# Patient Record
Sex: MALE | Race: WHITE | NOT HISPANIC OR LATINO | Employment: OTHER | URBAN - METROPOLITAN AREA
[De-identification: names, ages, dates, MRNs, and addresses within clinical notes are randomized per-mention and may not be internally consistent; named-entity substitution may affect disease eponyms.]

---

## 2018-01-26 ENCOUNTER — OFFICE VISIT CONVERTED (OUTPATIENT)
Dept: FAMILY MEDICINE CLINIC | Facility: CLINIC | Age: 53
End: 2018-01-26
Attending: NURSE PRACTITIONER

## 2019-11-06 ENCOUNTER — HOSPITAL ENCOUNTER (OUTPATIENT)
Dept: LAB | Facility: HOSPITAL | Age: 54
Discharge: HOME | End: 2019-11-06
Attending: NURSE PRACTITIONER

## 2019-11-06 LAB
25(OH)D3 SERPL-MCNC: 36.3 NG/ML (ref 30–100)
ALBUMIN SERPL-MCNC: 5 G/DL (ref 3.5–5)
ALBUMIN/GLOB SERPL: 1.7 {RATIO} (ref 1.4–2.6)
ALP SERPL-CCNC: 72 U/L (ref 56–119)
ALT SERPL-CCNC: 28 U/L (ref 10–40)
ANION GAP SERPL CALC-SCNC: 24 MMOL/L (ref 8–19)
AST SERPL-CCNC: 23 U/L (ref 15–50)
BASOPHILS # BLD AUTO: 0.03 10*3/UL (ref 0–0.2)
BASOPHILS NFR BLD AUTO: 0.7 % (ref 0–3)
BILIRUB SERPL-MCNC: 0.39 MG/DL (ref 0.2–1.3)
BUN SERPL-MCNC: 14 MG/DL (ref 5–25)
BUN/CREAT SERPL: 14 {RATIO} (ref 6–20)
CALCIUM SERPL-MCNC: 9.8 MG/DL (ref 8.7–10.4)
CHLORIDE SERPL-SCNC: 99 MMOL/L (ref 99–111)
CHOLEST SERPL-MCNC: 231 MG/DL (ref 107–200)
CHOLEST/HDLC SERPL: 3.4 {RATIO} (ref 3–6)
CONV ABS IMM GRAN: 0.02 10*3/UL (ref 0–0.2)
CONV CO2: 23 MMOL/L (ref 22–32)
CONV IMMATURE GRAN: 0.5 % (ref 0–1.8)
CONV TOTAL PROTEIN: 8 G/DL (ref 6.3–8.2)
CREAT UR-MCNC: 1.02 MG/DL (ref 0.7–1.2)
DEPRECATED RDW RBC AUTO: 41.7 FL (ref 35.1–43.9)
EOSINOPHIL # BLD AUTO: 0.13 10*3/UL (ref 0–0.7)
EOSINOPHIL # BLD AUTO: 3 % (ref 0–7)
ERYTHROCYTE [DISTWIDTH] IN BLOOD BY AUTOMATED COUNT: 12.7 % (ref 11.6–14.4)
FOLATE SERPL-MCNC: >20 NG/ML (ref 4.8–20)
GFR SERPLBLD BASED ON 1.73 SQ M-ARVRAT: >60 ML/MIN/{1.73_M2}
GLOBULIN UR ELPH-MCNC: 3 G/DL (ref 2–3.5)
GLUCOSE SERPL-MCNC: 95 MG/DL (ref 70–99)
HCT VFR BLD AUTO: 47.5 % (ref 42–52)
HDLC SERPL-MCNC: 68 MG/DL (ref 40–60)
HGB BLD-MCNC: 15.8 G/DL (ref 14–18)
IRON SERPL-MCNC: 89 UG/DL (ref 70–180)
LDLC SERPL CALC-MCNC: 136 MG/DL (ref 70–100)
LYMPHOCYTES # BLD AUTO: 1.55 10*3/UL (ref 1–5)
LYMPHOCYTES NFR BLD AUTO: 35.4 % (ref 20–45)
MCH RBC QN AUTO: 29.9 PG (ref 27–31)
MCHC RBC AUTO-ENTMCNC: 33.3 G/DL (ref 33–37)
MCV RBC AUTO: 89.8 FL (ref 80–96)
MONOCYTES # BLD AUTO: 0.58 10*3/UL (ref 0.2–1.2)
MONOCYTES NFR BLD AUTO: 13.2 % (ref 3–10)
NEUTROPHILS # BLD AUTO: 2.07 10*3/UL (ref 2–8)
NEUTROPHILS NFR BLD AUTO: 47.2 % (ref 30–85)
NRBC CBCN: 0 % (ref 0–0.7)
OSMOLALITY SERPL CALC.SUM OF ELEC: 292 MOSM/KG (ref 273–304)
PLATELET # BLD AUTO: 240 10*3/UL (ref 130–400)
PMV BLD AUTO: 9.7 FL (ref 9.4–12.4)
POTASSIUM SERPL-SCNC: 4.6 MMOL/L (ref 3.5–5.3)
RBC # BLD AUTO: 5.29 10*6/UL (ref 4.7–6.1)
SODIUM SERPL-SCNC: 141 MMOL/L (ref 135–147)
T4 FREE SERPL-MCNC: 1.2 NG/DL (ref 0.9–1.8)
TRIGL SERPL-MCNC: 134 MG/DL (ref 40–150)
TSH SERPL-ACNC: 5.68 M[IU]/L (ref 0.27–4.2)
VIT B12 SERPL-MCNC: 591 PG/ML (ref 211–911)
VLDLC SERPL-MCNC: 27 MG/DL (ref 5–37)
WBC # BLD AUTO: 4.38 10*3/UL (ref 4.8–10.8)

## 2019-11-07 LAB — T3FREE SERPL-MCNC: 3.4 PG/ML (ref 2–4.4)

## 2020-01-07 ENCOUNTER — HOSPITAL ENCOUNTER (OUTPATIENT)
Dept: LAB | Facility: HOSPITAL | Age: 55
Discharge: HOME | End: 2020-01-07
Attending: NURSE PRACTITIONER

## 2020-01-07 LAB
T4 FREE SERPL-MCNC: 1.5 NG/DL (ref 0.9–1.8)
TSH SERPL-ACNC: 4.05 M[IU]/L (ref 0.27–4.2)

## 2020-01-08 LAB — T3FREE SERPL-MCNC: 3.3 PG/ML (ref 2–4.4)

## 2020-02-28 ENCOUNTER — HOSPITAL ENCOUNTER (OUTPATIENT)
Dept: LAB | Facility: HOSPITAL | Age: 55
Discharge: HOME | End: 2020-02-28
Attending: NURSE PRACTITIONER

## 2020-02-28 LAB
T4 FREE SERPL-MCNC: 1.6 NG/DL (ref 0.9–1.8)
TSH SERPL-ACNC: 2.21 M[IU]/L (ref 0.27–4.2)

## 2020-10-05 ENCOUNTER — HOSPITAL ENCOUNTER (OUTPATIENT)
Dept: LAB | Facility: HOSPITAL | Age: 55
Discharge: HOME | End: 2020-10-05
Attending: NURSE PRACTITIONER

## 2020-10-05 LAB
25(OH)D3 SERPL-MCNC: 36.8 NG/ML (ref 30–100)
ALBUMIN SERPL-MCNC: 4.6 G/DL (ref 3.5–5)
ALBUMIN/GLOB SERPL: 1.7 {RATIO} (ref 1.4–2.6)
ALP SERPL-CCNC: 46 U/L (ref 56–119)
ALT SERPL-CCNC: 30 U/L (ref 10–40)
ANION GAP SERPL CALC-SCNC: 17 MMOL/L (ref 8–19)
AST SERPL-CCNC: 25 U/L (ref 15–50)
BASOPHILS # BLD AUTO: 0.02 10*3/UL (ref 0–0.2)
BASOPHILS NFR BLD AUTO: 0.5 % (ref 0–3)
BILIRUB SERPL-MCNC: 0.43 MG/DL (ref 0.2–1.3)
BUN SERPL-MCNC: 16 MG/DL (ref 5–25)
BUN/CREAT SERPL: 13 {RATIO} (ref 6–20)
CALCIUM SERPL-MCNC: 9.4 MG/DL (ref 8.7–10.4)
CHLORIDE SERPL-SCNC: 101 MMOL/L (ref 99–111)
CHOLEST SERPL-MCNC: 224 MG/DL (ref 107–200)
CHOLEST/HDLC SERPL: 3.7 {RATIO} (ref 3–6)
CONV ABS IMM GRAN: 0.01 10*3/UL (ref 0–0.2)
CONV CO2: 24 MMOL/L (ref 22–32)
CONV IMMATURE GRAN: 0.3 % (ref 0–1.8)
CONV TOTAL PROTEIN: 7.3 G/DL (ref 6.3–8.2)
CREAT UR-MCNC: 1.2 MG/DL (ref 0.7–1.2)
DEPRECATED RDW RBC AUTO: 44.4 FL (ref 35.1–43.9)
EOSINOPHIL # BLD AUTO: 0.1 10*3/UL (ref 0–0.7)
EOSINOPHIL # BLD AUTO: 2.7 % (ref 0–7)
ERYTHROCYTE [DISTWIDTH] IN BLOOD BY AUTOMATED COUNT: 13 % (ref 11.6–14.4)
FOLATE SERPL-MCNC: 14.7 NG/ML (ref 4.8–20)
GFR SERPLBLD BASED ON 1.73 SQ M-ARVRAT: >60 ML/MIN/{1.73_M2}
GLOBULIN UR ELPH-MCNC: 2.7 G/DL (ref 2–3.5)
GLUCOSE SERPL-MCNC: 95 MG/DL (ref 70–99)
HCT VFR BLD AUTO: 46.9 % (ref 42–52)
HDLC SERPL-MCNC: 60 MG/DL (ref 40–60)
HGB BLD-MCNC: 15.2 G/DL (ref 14–18)
IRON SATN MFR SERPL: 27 % (ref 20–55)
IRON SERPL-MCNC: 103 UG/DL (ref 70–180)
LDLC SERPL CALC-MCNC: 124 MG/DL (ref 70–100)
LYMPHOCYTES # BLD AUTO: 1.48 10*3/UL (ref 1–5)
LYMPHOCYTES NFR BLD AUTO: 39.7 % (ref 20–45)
MCH RBC QN AUTO: 30 PG (ref 27–31)
MCHC RBC AUTO-ENTMCNC: 32.4 G/DL (ref 33–37)
MCV RBC AUTO: 92.7 FL (ref 80–96)
MONOCYTES # BLD AUTO: 0.4 10*3/UL (ref 0.2–1.2)
MONOCYTES NFR BLD AUTO: 10.7 % (ref 3–10)
NEUTROPHILS # BLD AUTO: 1.72 10*3/UL (ref 2–8)
NEUTROPHILS NFR BLD AUTO: 46.1 % (ref 30–85)
NRBC CBCN: 0 % (ref 0–0.7)
OSMOLALITY SERPL CALC.SUM OF ELEC: 287 MOSM/KG (ref 273–304)
PLATELET # BLD AUTO: 230 10*3/UL (ref 130–400)
PMV BLD AUTO: 9.8 FL (ref 9.4–12.4)
POTASSIUM SERPL-SCNC: 4 MMOL/L (ref 3.5–5.3)
PROLACTIN SERPL-MCNC: 8.9 NG/ML
RBC # BLD AUTO: 5.06 10*6/UL (ref 4.7–6.1)
SODIUM SERPL-SCNC: 138 MMOL/L (ref 135–147)
TESTOST SERPL-MCNC: 210 NG/DL (ref 193–740)
TIBC SERPL-MCNC: 388 UG/DL (ref 245–450)
TRANSFERRIN SERPL-MCNC: 271 MG/DL (ref 215–365)
TRIGL SERPL-MCNC: 200 MG/DL (ref 40–150)
TSH SERPL-ACNC: 2.71 M[IU]/L (ref 0.27–4.2)
VIT B12 SERPL-MCNC: 999 PG/ML (ref 211–911)
VLDLC SERPL-MCNC: 40 MG/DL (ref 5–37)
WBC # BLD AUTO: 3.73 10*3/UL (ref 4.8–10.8)

## 2020-10-06 LAB — TESTOSTERONE, FREE: 7.7 PG/ML (ref 7.2–24)

## 2020-10-10 LAB — CONV ESTROGENS, TOTAL, SERUM: 144 PG/ML (ref 40–115)

## 2021-03-22 ENCOUNTER — HOSPITAL ENCOUNTER (OUTPATIENT)
Dept: VACCINE CLINIC | Facility: HOSPITAL | Age: 56
Discharge: HOME | End: 2021-03-22
Attending: INTERNAL MEDICINE

## 2021-03-26 ENCOUNTER — HOSPITAL ENCOUNTER (OUTPATIENT)
Dept: LAB | Facility: HOSPITAL | Age: 56
Discharge: HOME | End: 2021-03-26
Attending: NURSE PRACTITIONER

## 2021-03-26 LAB
BASOPHILS # BLD AUTO: 0.03 10*3/UL (ref 0–0.2)
BASOPHILS NFR BLD AUTO: 0.7 % (ref 0–3)
CHOLEST SERPL-MCNC: 220 MG/DL (ref 107–200)
CHOLEST/HDLC SERPL: 3.7 {RATIO} (ref 3–6)
CONV ABS IMM GRAN: 0.02 10*3/UL (ref 0–0.2)
CONV IMMATURE GRAN: 0.4 % (ref 0–1.8)
DEPRECATED RDW RBC AUTO: 40.4 FL (ref 35.1–43.9)
EOSINOPHIL # BLD AUTO: 0.12 10*3/UL (ref 0–0.7)
EOSINOPHIL # BLD AUTO: 2.6 % (ref 0–7)
ERYTHROCYTE [DISTWIDTH] IN BLOOD BY AUTOMATED COUNT: 12.6 % (ref 11.6–14.4)
FOLATE SERPL-MCNC: 16.5 NG/ML (ref 4.8–20)
HCT VFR BLD AUTO: 45.9 % (ref 42–52)
HDLC SERPL-MCNC: 59 MG/DL (ref 40–60)
HGB BLD-MCNC: 15.8 G/DL (ref 14–18)
IRON SATN MFR SERPL: 19 % (ref 20–55)
IRON SERPL-MCNC: 70 UG/DL (ref 70–180)
LDLC SERPL CALC-MCNC: 118 MG/DL (ref 70–100)
LYMPHOCYTES # BLD AUTO: 1.54 10*3/UL (ref 1–5)
LYMPHOCYTES NFR BLD AUTO: 33.8 % (ref 20–45)
MCH RBC QN AUTO: 30.2 PG (ref 27–31)
MCHC RBC AUTO-ENTMCNC: 34.4 G/DL (ref 33–37)
MCV RBC AUTO: 87.8 FL (ref 80–96)
MONOCYTES # BLD AUTO: 0.6 10*3/UL (ref 0.2–1.2)
MONOCYTES NFR BLD AUTO: 13.2 % (ref 3–10)
NEUTROPHILS # BLD AUTO: 2.24 10*3/UL (ref 2–8)
NEUTROPHILS NFR BLD AUTO: 49.3 % (ref 30–85)
NRBC CBCN: 0 % (ref 0–0.7)
PLATELET # BLD AUTO: 212 10*3/UL (ref 130–400)
PMV BLD AUTO: 10.1 FL (ref 9.4–12.4)
RBC # BLD AUTO: 5.23 10*6/UL (ref 4.7–6.1)
T4 FREE SERPL-MCNC: 1.3 NG/DL (ref 0.9–1.8)
TIBC SERPL-MCNC: 373 UG/DL (ref 245–450)
TRANSFERRIN SERPL-MCNC: 261 MG/DL (ref 215–365)
TRIGL SERPL-MCNC: 215 MG/DL (ref 40–150)
TSH SERPL-ACNC: 3.08 M[IU]/L (ref 0.27–4.2)
VIT B12 SERPL-MCNC: 781 PG/ML (ref 211–911)
VLDLC SERPL-MCNC: 43 MG/DL (ref 5–37)
WBC # BLD AUTO: 4.55 10*3/UL (ref 4.8–10.8)

## 2021-04-12 ENCOUNTER — HOSPITAL ENCOUNTER (OUTPATIENT)
Dept: VACCINE CLINIC | Facility: HOSPITAL | Age: 56
Discharge: HOME | End: 2021-04-12
Attending: INTERNAL MEDICINE

## 2021-05-16 VITALS
DIASTOLIC BLOOD PRESSURE: 75 MMHG | HEIGHT: 73 IN | WEIGHT: 266 LBS | TEMPERATURE: 96.7 F | OXYGEN SATURATION: 98 % | BODY MASS INDEX: 35.25 KG/M2 | HEART RATE: 82 BPM | SYSTOLIC BLOOD PRESSURE: 122 MMHG | RESPIRATION RATE: 16 BRPM

## 2021-10-05 ENCOUNTER — TRANSCRIBE ORDERS (OUTPATIENT)
Dept: LAB | Facility: HOSPITAL | Age: 56
End: 2021-10-05

## 2021-10-05 ENCOUNTER — LAB (OUTPATIENT)
Dept: LAB | Facility: HOSPITAL | Age: 56
End: 2021-10-05

## 2021-10-05 DIAGNOSIS — E28.0 HYPERESTROGENISM: Primary | ICD-10-CM

## 2021-10-05 DIAGNOSIS — I51.9 MYXEDEMA HEART DISEASE: ICD-10-CM

## 2021-10-05 DIAGNOSIS — E78.5 HYPERLIPIDEMIA, UNSPECIFIED HYPERLIPIDEMIA TYPE: ICD-10-CM

## 2021-10-05 DIAGNOSIS — D51.9 ANEMIA DUE TO VITAMIN B12 DEFICIENCY, UNSPECIFIED B12 DEFICIENCY TYPE: ICD-10-CM

## 2021-10-05 DIAGNOSIS — E03.9 MYXEDEMA HEART DISEASE: ICD-10-CM

## 2021-10-05 DIAGNOSIS — E28.0 HYPERESTROGENISM: ICD-10-CM

## 2021-10-05 DIAGNOSIS — R53.83 TIREDNESS: ICD-10-CM

## 2021-10-05 DIAGNOSIS — E55.9 VITAMIN D DEFICIENCY DISEASE: ICD-10-CM

## 2021-10-05 LAB
25(OH)D3 SERPL-MCNC: 50.1 NG/ML
ALBUMIN SERPL-MCNC: 4.8 G/DL (ref 3.5–5.2)
ALBUMIN/GLOB SERPL: 1.5 G/DL
ALP SERPL-CCNC: 53 U/L (ref 39–117)
ALT SERPL W P-5'-P-CCNC: 34 U/L (ref 1–41)
ANION GAP SERPL CALCULATED.3IONS-SCNC: 11.9 MMOL/L (ref 5–15)
AST SERPL-CCNC: 22 U/L (ref 1–40)
BASOPHILS # BLD AUTO: 0.02 10*3/MM3 (ref 0–0.2)
BASOPHILS NFR BLD AUTO: 0.4 % (ref 0–1.5)
BILIRUB SERPL-MCNC: 0.3 MG/DL (ref 0–1.2)
BUN SERPL-MCNC: 15 MG/DL (ref 6–20)
BUN/CREAT SERPL: 15.3 (ref 7–25)
CALCIUM SPEC-SCNC: 9.8 MG/DL (ref 8.6–10.5)
CHLORIDE SERPL-SCNC: 99 MMOL/L (ref 98–107)
CHOLEST SERPL-MCNC: 252 MG/DL (ref 0–200)
CO2 SERPL-SCNC: 27.1 MMOL/L (ref 22–29)
CREAT SERPL-MCNC: 0.98 MG/DL (ref 0.76–1.27)
DEPRECATED RDW RBC AUTO: 43.6 FL (ref 37–54)
EOSINOPHIL # BLD AUTO: 0.1 10*3/MM3 (ref 0–0.4)
EOSINOPHIL NFR BLD AUTO: 2.1 % (ref 0.3–6.2)
ERYTHROCYTE [DISTWIDTH] IN BLOOD BY AUTOMATED COUNT: 13 % (ref 12.3–15.4)
FOLATE SERPL-MCNC: 9.59 NG/ML (ref 4.78–24.2)
GFR SERPL CREATININE-BSD FRML MDRD: 79 ML/MIN/1.73
GLOBULIN UR ELPH-MCNC: 3.1 GM/DL
GLUCOSE SERPL-MCNC: 87 MG/DL (ref 65–99)
HCT VFR BLD AUTO: 50.2 % (ref 37.5–51)
HDLC SERPL-MCNC: 62 MG/DL (ref 40–60)
HGB BLD-MCNC: 16.6 G/DL (ref 13–17.7)
IMM GRANULOCYTES # BLD AUTO: 0.02 10*3/MM3 (ref 0–0.05)
IMM GRANULOCYTES NFR BLD AUTO: 0.4 % (ref 0–0.5)
LDLC SERPL CALC-MCNC: 151 MG/DL (ref 0–100)
LDLC/HDLC SERPL: 2.36 {RATIO}
LYMPHOCYTES # BLD AUTO: 1.35 10*3/MM3 (ref 0.7–3.1)
LYMPHOCYTES NFR BLD AUTO: 28.7 % (ref 19.6–45.3)
MCH RBC QN AUTO: 30.1 PG (ref 26.6–33)
MCHC RBC AUTO-ENTMCNC: 33.1 G/DL (ref 31.5–35.7)
MCV RBC AUTO: 91.1 FL (ref 79–97)
MONOCYTES # BLD AUTO: 0.5 10*3/MM3 (ref 0.1–0.9)
MONOCYTES NFR BLD AUTO: 10.6 % (ref 5–12)
NEUTROPHILS NFR BLD AUTO: 2.72 10*3/MM3 (ref 1.7–7)
NEUTROPHILS NFR BLD AUTO: 57.8 % (ref 42.7–76)
NRBC BLD AUTO-RTO: 0 /100 WBC (ref 0–0.2)
PLATELET # BLD AUTO: 228 10*3/MM3 (ref 140–450)
PMV BLD AUTO: 9.5 FL (ref 6–12)
POTASSIUM SERPL-SCNC: 4.2 MMOL/L (ref 3.5–5.2)
PROT SERPL-MCNC: 7.9 G/DL (ref 6–8.5)
RBC # BLD AUTO: 5.51 10*6/MM3 (ref 4.14–5.8)
SODIUM SERPL-SCNC: 138 MMOL/L (ref 136–145)
TRIGL SERPL-MCNC: 218 MG/DL (ref 0–150)
TSH SERPL DL<=0.05 MIU/L-ACNC: 2.81 UIU/ML (ref 0.27–4.2)
VIT B12 BLD-MCNC: 881 PG/ML (ref 211–946)
VLDLC SERPL-MCNC: 39 MG/DL (ref 5–40)
WBC # BLD AUTO: 4.71 10*3/MM3 (ref 3.4–10.8)

## 2021-10-05 PROCEDURE — 84403 ASSAY OF TOTAL TESTOSTERONE: CPT

## 2021-10-05 PROCEDURE — 80061 LIPID PANEL: CPT

## 2021-10-05 PROCEDURE — 80053 COMPREHEN METABOLIC PANEL: CPT

## 2021-10-05 PROCEDURE — 84443 ASSAY THYROID STIM HORMONE: CPT

## 2021-10-05 PROCEDURE — 36415 COLL VENOUS BLD VENIPUNCTURE: CPT

## 2021-10-05 PROCEDURE — 85025 COMPLETE CBC W/AUTO DIFF WBC: CPT

## 2021-10-05 PROCEDURE — 82746 ASSAY OF FOLIC ACID SERUM: CPT

## 2021-10-05 PROCEDURE — 84402 ASSAY OF FREE TESTOSTERONE: CPT

## 2021-10-05 PROCEDURE — 82607 VITAMIN B-12: CPT

## 2021-10-05 PROCEDURE — 82306 VITAMIN D 25 HYDROXY: CPT

## 2021-10-05 PROCEDURE — 82672 ASSAY OF ESTROGEN: CPT

## 2021-10-08 LAB — ESTROGEN SERPL-MCNC: 79 PG/ML (ref 56–213)

## 2021-10-10 LAB
TESTOST FREE SERPL-MCNC: 6.3 PG/ML (ref 7.2–24)
TESTOST SERPL-MCNC: 210 NG/DL (ref 264–916)

## 2021-12-01 ENCOUNTER — IMMUNIZATION (OUTPATIENT)
Dept: VACCINE CLINIC | Facility: HOSPITAL | Age: 56
End: 2021-12-01

## 2021-12-01 PROCEDURE — 0004A HC ADM SARSCOV2 30MCG/0.3ML BOOSTER: CPT | Performed by: INTERNAL MEDICINE

## 2021-12-01 PROCEDURE — 91300 HC SARSCOV02 VAC 30MCG/0.3ML IM: CPT | Performed by: INTERNAL MEDICINE

## 2022-04-18 ENCOUNTER — LAB (OUTPATIENT)
Dept: LAB | Facility: HOSPITAL | Age: 57
End: 2022-04-18

## 2022-04-18 ENCOUNTER — TRANSCRIBE ORDERS (OUTPATIENT)
Dept: LAB | Facility: HOSPITAL | Age: 57
End: 2022-04-18

## 2022-04-18 DIAGNOSIS — I10 ESSENTIAL HYPERTENSION, MALIGNANT: ICD-10-CM

## 2022-04-18 DIAGNOSIS — E55.9 VITAMIN D DEFICIENCY: ICD-10-CM

## 2022-04-18 DIAGNOSIS — E78.2 MIXED HYPERLIPIDEMIA: ICD-10-CM

## 2022-04-18 DIAGNOSIS — E03.9 HYPOTHYROIDISM, ADULT: ICD-10-CM

## 2022-04-18 DIAGNOSIS — E55.9 VITAMIN D DEFICIENCY: Primary | ICD-10-CM

## 2022-04-18 LAB
25(OH)D3 SERPL-MCNC: 54.3 NG/ML (ref 30–100)
ALBUMIN SERPL-MCNC: 4.7 G/DL (ref 3.5–5.2)
ALBUMIN/GLOB SERPL: 2 G/DL
ALP SERPL-CCNC: 58 U/L (ref 39–117)
ALT SERPL W P-5'-P-CCNC: 50 U/L (ref 1–41)
ANION GAP SERPL CALCULATED.3IONS-SCNC: 13.2 MMOL/L (ref 5–15)
AST SERPL-CCNC: 29 U/L (ref 1–40)
BASOPHILS # BLD AUTO: 0.02 10*3/MM3 (ref 0–0.2)
BASOPHILS NFR BLD AUTO: 0.4 % (ref 0–1.5)
BILIRUB SERPL-MCNC: 0.6 MG/DL (ref 0–1.2)
BUN SERPL-MCNC: 17 MG/DL (ref 6–20)
BUN/CREAT SERPL: 18.1 (ref 7–25)
CALCIUM SPEC-SCNC: 9.4 MG/DL (ref 8.6–10.5)
CHLORIDE SERPL-SCNC: 99 MMOL/L (ref 98–107)
CHOLEST SERPL-MCNC: 178 MG/DL (ref 0–200)
CO2 SERPL-SCNC: 27.8 MMOL/L (ref 22–29)
CREAT SERPL-MCNC: 0.94 MG/DL (ref 0.76–1.27)
DEPRECATED RDW RBC AUTO: 42 FL (ref 37–54)
EGFRCR SERPLBLD CKD-EPI 2021: 95.1 ML/MIN/1.73
EOSINOPHIL # BLD AUTO: 0.11 10*3/MM3 (ref 0–0.4)
EOSINOPHIL NFR BLD AUTO: 2.2 % (ref 0.3–6.2)
ERYTHROCYTE [DISTWIDTH] IN BLOOD BY AUTOMATED COUNT: 12.8 % (ref 12.3–15.4)
GLOBULIN UR ELPH-MCNC: 2.4 GM/DL
GLUCOSE SERPL-MCNC: 90 MG/DL (ref 65–99)
HCT VFR BLD AUTO: 47.1 % (ref 37.5–51)
HDLC SERPL-MCNC: 69 MG/DL (ref 40–60)
HGB BLD-MCNC: 15.9 G/DL (ref 13–17.7)
IMM GRANULOCYTES # BLD AUTO: 0.02 10*3/MM3 (ref 0–0.05)
IMM GRANULOCYTES NFR BLD AUTO: 0.4 % (ref 0–0.5)
LDLC SERPL CALC-MCNC: 79 MG/DL (ref 0–100)
LDLC/HDLC SERPL: 1.06 {RATIO}
LYMPHOCYTES # BLD AUTO: 1.78 10*3/MM3 (ref 0.7–3.1)
LYMPHOCYTES NFR BLD AUTO: 36 % (ref 19.6–45.3)
MCH RBC QN AUTO: 30.3 PG (ref 26.6–33)
MCHC RBC AUTO-ENTMCNC: 33.8 G/DL (ref 31.5–35.7)
MCV RBC AUTO: 89.9 FL (ref 79–97)
MONOCYTES # BLD AUTO: 0.57 10*3/MM3 (ref 0.1–0.9)
MONOCYTES NFR BLD AUTO: 11.5 % (ref 5–12)
NEUTROPHILS NFR BLD AUTO: 2.44 10*3/MM3 (ref 1.7–7)
NEUTROPHILS NFR BLD AUTO: 49.5 % (ref 42.7–76)
NRBC BLD AUTO-RTO: 0 /100 WBC (ref 0–0.2)
PLATELET # BLD AUTO: 213 10*3/MM3 (ref 140–450)
PMV BLD AUTO: 10 FL (ref 6–12)
POTASSIUM SERPL-SCNC: 4.1 MMOL/L (ref 3.5–5.2)
PROT SERPL-MCNC: 7.1 G/DL (ref 6–8.5)
RBC # BLD AUTO: 5.24 10*6/MM3 (ref 4.14–5.8)
SODIUM SERPL-SCNC: 140 MMOL/L (ref 136–145)
T4 FREE SERPL-MCNC: 1.3 NG/DL (ref 0.93–1.7)
TRIGL SERPL-MCNC: 180 MG/DL (ref 0–150)
TSH SERPL DL<=0.05 MIU/L-ACNC: 4.81 UIU/ML (ref 0.27–4.2)
VLDLC SERPL-MCNC: 30 MG/DL (ref 5–40)
WBC NRBC COR # BLD: 4.94 10*3/MM3 (ref 3.4–10.8)

## 2022-04-18 PROCEDURE — 36415 COLL VENOUS BLD VENIPUNCTURE: CPT

## 2022-04-18 PROCEDURE — 80061 LIPID PANEL: CPT

## 2022-04-18 PROCEDURE — 80050 GENERAL HEALTH PANEL: CPT

## 2022-04-18 PROCEDURE — 82306 VITAMIN D 25 HYDROXY: CPT

## 2022-04-18 PROCEDURE — 84439 ASSAY OF FREE THYROXINE: CPT

## 2022-11-04 ENCOUNTER — TRANSCRIBE ORDERS (OUTPATIENT)
Dept: LAB | Facility: HOSPITAL | Age: 57
End: 2022-11-04

## 2022-11-04 ENCOUNTER — LAB (OUTPATIENT)
Dept: LAB | Facility: HOSPITAL | Age: 57
End: 2022-11-04

## 2022-11-04 DIAGNOSIS — E78.2 MIXED HYPERLIPIDEMIA: ICD-10-CM

## 2022-11-04 DIAGNOSIS — Z11.59 SCREENING EXAMINATION FOR POLIOMYELITIS: ICD-10-CM

## 2022-11-04 DIAGNOSIS — E03.9 HYPOTHYROIDISM, UNSPECIFIED TYPE: ICD-10-CM

## 2022-11-04 DIAGNOSIS — I10 HYPERTENSION, UNSPECIFIED TYPE: Primary | ICD-10-CM

## 2022-11-04 DIAGNOSIS — I10 HYPERTENSION, UNSPECIFIED TYPE: ICD-10-CM

## 2022-11-04 LAB
25(OH)D3 SERPL-MCNC: 68.7 NG/ML (ref 30–100)
ALBUMIN SERPL-MCNC: 4.6 G/DL (ref 3.5–5.2)
ALBUMIN UR-MCNC: <1.2 MG/DL
ALBUMIN/GLOB SERPL: 1.8 G/DL
ALP SERPL-CCNC: 51 U/L (ref 39–117)
ALT SERPL W P-5'-P-CCNC: 20 U/L (ref 1–41)
ANION GAP SERPL CALCULATED.3IONS-SCNC: 10 MMOL/L (ref 5–15)
AST SERPL-CCNC: 17 U/L (ref 1–40)
BASOPHILS # BLD AUTO: 0.02 10*3/MM3 (ref 0–0.2)
BASOPHILS NFR BLD AUTO: 0.4 % (ref 0–1.5)
BILIRUB SERPL-MCNC: 0.5 MG/DL (ref 0–1.2)
BUN SERPL-MCNC: 15 MG/DL (ref 6–20)
BUN/CREAT SERPL: 14.6 (ref 7–25)
CALCIUM SPEC-SCNC: 9.7 MG/DL (ref 8.6–10.5)
CHLORIDE SERPL-SCNC: 101 MMOL/L (ref 98–107)
CHOLEST SERPL-MCNC: 217 MG/DL (ref 0–200)
CO2 SERPL-SCNC: 27 MMOL/L (ref 22–29)
CREAT SERPL-MCNC: 1.03 MG/DL (ref 0.76–1.27)
CREAT UR-MCNC: 131.3 MG/DL
DEPRECATED RDW RBC AUTO: 41.3 FL (ref 37–54)
EGFRCR SERPLBLD CKD-EPI 2021: 85.3 ML/MIN/1.73
EOSINOPHIL # BLD AUTO: 0.12 10*3/MM3 (ref 0–0.4)
EOSINOPHIL NFR BLD AUTO: 2.3 % (ref 0.3–6.2)
ERYTHROCYTE [DISTWIDTH] IN BLOOD BY AUTOMATED COUNT: 12.8 % (ref 12.3–15.4)
GLOBULIN UR ELPH-MCNC: 2.6 GM/DL
GLUCOSE SERPL-MCNC: 98 MG/DL (ref 65–99)
HBA1C MFR BLD: 5.4 % (ref 4.8–5.6)
HCT VFR BLD AUTO: 48.4 % (ref 37.5–51)
HCV AB SER DONR QL: NORMAL
HDLC SERPL-MCNC: 63 MG/DL (ref 40–60)
HGB BLD-MCNC: 16.8 G/DL (ref 13–17.7)
IMM GRANULOCYTES # BLD AUTO: 0.02 10*3/MM3 (ref 0–0.05)
IMM GRANULOCYTES NFR BLD AUTO: 0.4 % (ref 0–0.5)
LDLC SERPL CALC-MCNC: 123 MG/DL (ref 0–100)
LDLC/HDLC SERPL: 1.88 {RATIO}
LYMPHOCYTES # BLD AUTO: 1.58 10*3/MM3 (ref 0.7–3.1)
LYMPHOCYTES NFR BLD AUTO: 30 % (ref 19.6–45.3)
MCH RBC QN AUTO: 31.2 PG (ref 26.6–33)
MCHC RBC AUTO-ENTMCNC: 34.7 G/DL (ref 31.5–35.7)
MCV RBC AUTO: 89.8 FL (ref 79–97)
MICROALBUMIN/CREAT UR: NORMAL MG/G{CREAT}
MONOCYTES # BLD AUTO: 0.54 10*3/MM3 (ref 0.1–0.9)
MONOCYTES NFR BLD AUTO: 10.3 % (ref 5–12)
NEUTROPHILS NFR BLD AUTO: 2.98 10*3/MM3 (ref 1.7–7)
NEUTROPHILS NFR BLD AUTO: 56.6 % (ref 42.7–76)
NRBC BLD AUTO-RTO: 0 /100 WBC (ref 0–0.2)
PLATELET # BLD AUTO: 236 10*3/MM3 (ref 140–450)
PMV BLD AUTO: 9.8 FL (ref 6–12)
POTASSIUM SERPL-SCNC: 4.3 MMOL/L (ref 3.5–5.2)
PROT SERPL-MCNC: 7.2 G/DL (ref 6–8.5)
RBC # BLD AUTO: 5.39 10*6/MM3 (ref 4.14–5.8)
SODIUM SERPL-SCNC: 138 MMOL/L (ref 136–145)
T4 FREE SERPL-MCNC: 1.57 NG/DL (ref 0.93–1.7)
TRIGL SERPL-MCNC: 178 MG/DL (ref 0–150)
TSH SERPL DL<=0.05 MIU/L-ACNC: 2.46 UIU/ML (ref 0.27–4.2)
VLDLC SERPL-MCNC: 31 MG/DL (ref 5–40)
WBC NRBC COR # BLD: 5.26 10*3/MM3 (ref 3.4–10.8)

## 2022-11-04 PROCEDURE — 82306 VITAMIN D 25 HYDROXY: CPT

## 2022-11-04 PROCEDURE — 80050 GENERAL HEALTH PANEL: CPT

## 2022-11-04 PROCEDURE — 82043 UR ALBUMIN QUANTITATIVE: CPT

## 2022-11-04 PROCEDURE — 80061 LIPID PANEL: CPT

## 2022-11-04 PROCEDURE — 82570 ASSAY OF URINE CREATININE: CPT

## 2022-11-04 PROCEDURE — 83036 HEMOGLOBIN GLYCOSYLATED A1C: CPT

## 2022-11-04 PROCEDURE — 36415 COLL VENOUS BLD VENIPUNCTURE: CPT

## 2022-11-04 PROCEDURE — 86803 HEPATITIS C AB TEST: CPT

## 2022-11-04 PROCEDURE — 84439 ASSAY OF FREE THYROXINE: CPT

## 2022-11-07 ENCOUNTER — PREP FOR SURGERY (OUTPATIENT)
Dept: OTHER | Facility: HOSPITAL | Age: 57
End: 2022-11-07

## 2022-11-07 ENCOUNTER — CLINICAL SUPPORT (OUTPATIENT)
Dept: GASTROENTEROLOGY | Facility: CLINIC | Age: 57
End: 2022-11-07

## 2022-11-07 DIAGNOSIS — Z86.010 HX OF COLONIC POLYP: Primary | ICD-10-CM

## 2022-11-07 RX ORDER — CETIRIZINE HYDROCHLORIDE 10 MG/1
TABLET ORAL
COMMUNITY

## 2022-11-07 RX ORDER — LEVOTHYROXINE SODIUM 0.07 MG/1
TABLET ORAL
COMMUNITY
Start: 2022-10-17 | End: 2023-02-06

## 2022-11-07 RX ORDER — UBIDECARENONE 75 MG
CAPSULE ORAL
COMMUNITY

## 2022-11-07 RX ORDER — ASPIRIN 81 MG/1
TABLET, CHEWABLE ORAL
COMMUNITY

## 2022-11-07 RX ORDER — CHOLECALCIFEROL (VITAMIN D3) 10(400)/ML
DROPS ORAL
COMMUNITY

## 2022-11-07 NOTE — PROGRESS NOTES
Kevin Sampson  REASON FOR CALL: SCREENING CALL, LAST COLON 2017  SENT IN PREP: SEGRO  DOS: 2023    Past Medical History:   Diagnosis Date   • Colon polyp     first visit to    • Hernia 2000    repaired      No Known Allergies  Past Surgical History:   Procedure Laterality Date   • COLONOSCOPY         • HERNIA REPAIR       Social History     Socioeconomic History   • Marital status:    Tobacco Use   • Smoking status: Every Day     Packs/day: 0.50     Years: 40.00     Pack years: 20.00     Types: Cigarettes     Start date: 10/1/1980     Last attempt to quit: 10/1/2010     Years since quittin.1   • Smokeless tobacco: Former     Types: Chew   • Tobacco comments:     snuff/chewing tobacco , .  cigar occasional   Vaping Use   • Vaping Use: Never used   Substance and Sexual Activity   • Alcohol use: Yes     Alcohol/week: 10.0 standard drinks     Types: 2 Glasses of wine, 6 Cans of beer, 2 Drinks containing 0.5 oz of alcohol per week     Comment: WEEKLY   • Drug use: Never   • Sexual activity: Yes     Partners: Female     Family History   Problem Relation Age of Onset   • Crohn's disease Maternal Uncle    • Crohn's disease Paternal Grandmother    • Colon cancer Neg Hx        Current Outpatient Medications:   •  aspirin 81 MG chewable tablet, aspirin 81 mg oral tablet,chewable chew 1 tablet (81 mg) by oral route once daily   Active, Disp: , Rfl:   •  cetirizine (zyrTEC) 10 MG tablet, cetirizine 10 mg oral tablet take 1 tablet (10 mg) by oral route once daily   Suspended, Disp: , Rfl:   •  Cholecalciferol (Vitamin D) 10 MCG/ML liquid, Vitamin D, Disp: , Rfl:   •  levothyroxine (SYNTHROID, LEVOTHROID) 75 MCG tablet, , Disp: , Rfl:   •  vitamin B-12 (CYANOCOBALAMIN) 100 MCG tablet, Vitamin B12, Disp: , Rfl:     Answers for HPI/ROS submitted by the patient on 10/31/2022  What is the primary reason for your visit?: Other  Please describe your symptoms.:  Conoloscopy - preventative screening scheduled by  couple years ago.  Have you had these symptoms before?: No  How long have you been having these symptoms?: Greater than 2 weeks  Please list any medications you are currently taking for this condition.: n/a  Please describe any probable cause for these symptoms. : n/a  no symptoms

## 2022-11-08 PROBLEM — Z86.010 HX OF COLONIC POLYP: Status: ACTIVE | Noted: 2022-11-08

## 2022-11-08 PROBLEM — Z86.0100 HX OF COLONIC POLYP: Status: ACTIVE | Noted: 2022-11-08

## 2022-11-14 ENCOUNTER — TRANSCRIBE ORDERS (OUTPATIENT)
Dept: ADMINISTRATIVE | Facility: HOSPITAL | Age: 57
End: 2022-11-14

## 2022-11-14 DIAGNOSIS — F17.200 NICOTINE DEPENDENCE, UNCOMPLICATED, UNSPECIFIED NICOTINE PRODUCT TYPE: Primary | ICD-10-CM

## 2022-12-13 ENCOUNTER — APPOINTMENT (OUTPATIENT)
Dept: CT IMAGING | Facility: HOSPITAL | Age: 57
End: 2022-12-13

## 2022-12-13 ENCOUNTER — APPOINTMENT (OUTPATIENT)
Dept: ULTRASOUND IMAGING | Facility: HOSPITAL | Age: 57
End: 2022-12-13

## 2023-01-05 ENCOUNTER — APPOINTMENT (OUTPATIENT)
Dept: ULTRASOUND IMAGING | Facility: HOSPITAL | Age: 58
End: 2023-01-05

## 2023-01-11 ENCOUNTER — APPOINTMENT (OUTPATIENT)
Dept: ULTRASOUND IMAGING | Facility: HOSPITAL | Age: 58
End: 2023-01-11
Payer: COMMERCIAL

## 2023-01-11 ENCOUNTER — APPOINTMENT (OUTPATIENT)
Dept: CT IMAGING | Facility: HOSPITAL | Age: 58
End: 2023-01-11
Payer: COMMERCIAL

## 2023-01-24 ENCOUNTER — TELEPHONE (OUTPATIENT)
Dept: GASTROENTEROLOGY | Facility: CLINIC | Age: 58
End: 2023-01-24
Payer: COMMERCIAL

## 2023-01-24 NOTE — TELEPHONE ENCOUNTER
I spoke with Mr Sampson, confirmed his scheduled colonoscopy on 02.07.23, estimated arrival time of 8:00am. Reminded of liquid diet the day prior. Reminded of bowel prep and instructions.    Carol bellamy was originally sent in. I will send over suprep to pharmacy. Will send Keep[employer] message with prep instructions. Voiced understanding. aminata

## 2023-01-25 RX ORDER — SODIUM, POTASSIUM,MAG SULFATES 17.5-3.13G
2 SOLUTION, RECONSTITUTED, ORAL ORAL TAKE AS DIRECTED
Qty: 177 ML | Refills: 0 | Status: ON HOLD | OUTPATIENT
Start: 2023-01-25 | End: 2023-02-07

## 2023-02-06 RX ORDER — LEVOTHYROXINE SODIUM 0.07 MG/1
75 TABLET ORAL
COMMUNITY

## 2023-02-07 ENCOUNTER — HOSPITAL ENCOUNTER (OUTPATIENT)
Facility: HOSPITAL | Age: 58
Setting detail: HOSPITAL OUTPATIENT SURGERY
Discharge: HOME | End: 2023-02-07
Attending: INTERNAL MEDICINE | Admitting: INTERNAL MEDICINE
Payer: COMMERCIAL

## 2023-02-07 ENCOUNTER — ANESTHESIA EVENT (OUTPATIENT)
Dept: GASTROENTEROLOGY | Facility: HOSPITAL | Age: 58
End: 2023-02-07
Payer: COMMERCIAL

## 2023-02-07 ENCOUNTER — ANESTHESIA (OUTPATIENT)
Dept: GASTROENTEROLOGY | Facility: HOSPITAL | Age: 58
End: 2023-02-07
Payer: COMMERCIAL

## 2023-02-07 VITALS
SYSTOLIC BLOOD PRESSURE: 143 MMHG | BODY MASS INDEX: 35.7 KG/M2 | DIASTOLIC BLOOD PRESSURE: 81 MMHG | HEART RATE: 68 BPM | TEMPERATURE: 97.3 F | OXYGEN SATURATION: 97 % | WEIGHT: 269.4 LBS | HEIGHT: 73 IN | RESPIRATION RATE: 20 BRPM

## 2023-02-07 DIAGNOSIS — Z86.010 HX OF COLONIC POLYP: ICD-10-CM

## 2023-02-07 PROCEDURE — 88305 TISSUE EXAM BY PATHOLOGIST: CPT | Performed by: INTERNAL MEDICINE

## 2023-02-07 PROCEDURE — 45385 COLONOSCOPY W/LESION REMOVAL: CPT | Performed by: INTERNAL MEDICINE

## 2023-02-07 PROCEDURE — 25010000002 PROPOFOL 10 MG/ML EMULSION

## 2023-02-07 RX ORDER — LIDOCAINE HYDROCHLORIDE 20 MG/ML
INJECTION, SOLUTION EPIDURAL; INFILTRATION; INTRACAUDAL; PERINEURAL AS NEEDED
Status: DISCONTINUED | OUTPATIENT
Start: 2023-02-07 | End: 2023-02-07 | Stop reason: SURG

## 2023-02-07 RX ORDER — SODIUM CHLORIDE, SODIUM LACTATE, POTASSIUM CHLORIDE, CALCIUM CHLORIDE 600; 310; 30; 20 MG/100ML; MG/100ML; MG/100ML; MG/100ML
30 INJECTION, SOLUTION INTRAVENOUS CONTINUOUS
Status: DISCONTINUED | OUTPATIENT
Start: 2023-02-07 | End: 2023-02-07 | Stop reason: HOSPADM

## 2023-02-07 RX ORDER — PROPOFOL 10 MG/ML
VIAL (ML) INTRAVENOUS AS NEEDED
Status: DISCONTINUED | OUTPATIENT
Start: 2023-02-07 | End: 2023-02-07 | Stop reason: SURG

## 2023-02-07 RX ADMIN — PROPOFOL 200 MCG/KG/MIN: 10 INJECTION, EMULSION INTRAVENOUS at 09:43

## 2023-02-07 RX ADMIN — PROPOFOL 100 MG: 10 INJECTION, EMULSION INTRAVENOUS at 09:43

## 2023-02-07 RX ADMIN — SODIUM CHLORIDE, POTASSIUM CHLORIDE, SODIUM LACTATE AND CALCIUM CHLORIDE 30 ML/HR: 600; 310; 30; 20 INJECTION, SOLUTION INTRAVENOUS at 09:10

## 2023-02-07 RX ADMIN — LIDOCAINE HYDROCHLORIDE 50 MG: 20 INJECTION, SOLUTION EPIDURAL; INFILTRATION; INTRACAUDAL; PERINEURAL at 09:43

## 2023-02-07 NOTE — H&P
"ScreeningPre Procedure History & Physical    Chief Complaint:   Screening     Subjective     HPI:   Screening     Past Medical History:   Past Medical History:   Diagnosis Date   • Colon polyp 2014    first visit to    • Disease of thyroid gland    • Hernia 2000    repaired 2001   • Seasonal allergies        Past Surgical History:  Past Surgical History:   Procedure Laterality Date   • BACK SURGERY      \" a couple of back surgeries in the 90s \"   • COLONOSCOPY  2017       • HERNIA REPAIR  2001   • WISDOM TOOTH EXTRACTION         Family History:  Family History   Problem Relation Age of Onset   • Crohn's disease Maternal Uncle    • Colon cancer Paternal Grandmother    • Crohn's disease Paternal Grandmother    • Malig Hyperthermia Neg Hx        Social History:   reports that he quit smoking about 7 months ago. His smoking use included cigarettes. He started smoking about 42 years ago. He has a 20.00 pack-year smoking history. He has quit using smokeless tobacco.  His smokeless tobacco use included chew. He reports current alcohol use of about 10.0 standard drinks per week. He reports that he does not use drugs.    Medications:   Medications Prior to Admission   Medication Sig Dispense Refill Last Dose   • aspirin 81 MG chewable tablet aspirin 81 mg oral tablet,chewable chew 1 tablet (81 mg) by oral route once daily   Active   2/5/2023   • cetirizine (zyrTEC) 10 MG tablet cetirizine 10 mg oral tablet take 1 tablet (10 mg) by oral route once daily   Suspended   2/5/2023   • Cholecalciferol (Vitamin D) 10 MCG/ML liquid Vitamin D   2/5/2023   • levothyroxine (SYNTHROID, LEVOTHROID) 75 MCG tablet Take 75 mcg by mouth Every Morning.   2/6/2023   • vitamin B-12 (CYANOCOBALAMIN) 100 MCG tablet Vitamin B12   2/5/2023       Allergies:  Patient has no known allergies.        Objective     Blood pressure 148/89, pulse 76, temperature 98.1 °F (36.7 °C), temperature source Temporal, resp. rate 16, height 185.4 " "cm (73\"), weight 122 kg (269 lb 6.4 oz), SpO2 97 %.    Physical Exam   Constitutional: Pt is oriented to person, place, and time and well-developed, well-nourished, and in no distress.   Mouth/Throat: Oropharynx is clear and moist.   Neck: Normal range of motion.   Cardiovascular: Normal rate, regular rhythm and normal heart sounds.    Pulmonary/Chest: Effort normal and breath sounds normal.   Abdominal: Soft. Nontender  Skin: Skin is warm and dry.   Psychiatric: Mood, memory, affect and judgment normal.     Assessment & Plan     Diagnosis:  Screening colonoscopy  H/o colon polyps     Anticipated Surgical Procedure:  colonoscopy    The risks, benefits, and alternatives of this procedure have been discussed with the patient or the responsible party- the patient understands and agrees to proceed.            "

## 2023-02-07 NOTE — ANESTHESIA POSTPROCEDURE EVALUATION
Patient: Kevin Sampson    Procedure Summary     Date: 02/07/23 Room / Location: [facility] ENDOSCOPY 2 / [facility] ENDOSCOPY    Anesthesia Start: 0942 Anesthesia Stop: 1014    Procedure: COLONOSCOPY WITH POLYPECTOMIES Diagnosis:       Hx of colonic polyp      (Hx of colonic polyp [Z86.010])    Surgeons: Yasir Sunshine MD Provider: Osvaldo Huggins MD    Anesthesia Type: general ASA Status: 2          Anesthesia Type: general    Vitals  Vitals Value Taken Time   /81 02/07/23 1029   Temp 36.3 °C (97.3 °F) 02/07/23 1029   Pulse 62 02/07/23 1030   Resp 20 02/07/23 1029   SpO2 97 % 02/07/23 1030   Vitals shown include unvalidated device data.        Post Anesthesia Care and Evaluation    Patient location during evaluation: bedside  Patient participation: complete - patient participated  Level of consciousness: awake and alert  Pain management: adequate    Airway patency: patent  Anesthetic complications: No anesthetic complications  PONV Status: none  Cardiovascular status: acceptable  Respiratory status: acceptable  Hydration status: acceptable    Comments: An Anesthesiologist personally participated in the most demanding procedures (including induction and emergence if applicable) in the anesthesia plan, monitored the course of anesthesia administration at frequent intervals and remained physically present and available for immediate diagnosis and treatment of emergencies.

## 2023-02-08 LAB
CYTO UR: NORMAL
LAB AP CASE REPORT: NORMAL
LAB AP CLINICAL INFORMATION: NORMAL
PATH REPORT.FINAL DX SPEC: NORMAL
PATH REPORT.GROSS SPEC: NORMAL

## 2023-04-25 ENCOUNTER — OFFICE VISIT (OUTPATIENT)
Dept: INTERNAL MEDICINE | Facility: CLINIC | Age: 58
End: 2023-04-25
Payer: COMMERCIAL

## 2023-04-25 VITALS
WEIGHT: 274.4 LBS | DIASTOLIC BLOOD PRESSURE: 88 MMHG | OXYGEN SATURATION: 97 % | HEIGHT: 73 IN | TEMPERATURE: 97.1 F | HEART RATE: 93 BPM | SYSTOLIC BLOOD PRESSURE: 143 MMHG | BODY MASS INDEX: 36.37 KG/M2

## 2023-04-25 DIAGNOSIS — E29.1 HYPOGONADISM IN MALE: ICD-10-CM

## 2023-04-25 DIAGNOSIS — E03.8 HYPOTHYROIDISM DUE TO HASHIMOTO'S THYROIDITIS: ICD-10-CM

## 2023-04-25 DIAGNOSIS — E06.3 HYPOTHYROIDISM DUE TO HASHIMOTO'S THYROIDITIS: ICD-10-CM

## 2023-04-25 DIAGNOSIS — G56.03 BILATERAL CARPAL TUNNEL SYNDROME: ICD-10-CM

## 2023-04-25 DIAGNOSIS — H93.13 TINNITUS OF BOTH EARS: Primary | ICD-10-CM

## 2023-04-25 DIAGNOSIS — R10.32 DEEP INGUINAL PAIN, LEFT: ICD-10-CM

## 2023-04-25 DIAGNOSIS — R25.2 CRAMPS, MUSCLE, GENERAL: ICD-10-CM

## 2023-04-25 DIAGNOSIS — F17.201 TOBACCO USE DISORDER, MODERATE, IN EARLY REMISSION, DEPENDENCE: ICD-10-CM

## 2023-04-25 DIAGNOSIS — Z12.5 SCREENING PSA (PROSTATE SPECIFIC ANTIGEN): ICD-10-CM

## 2023-04-25 PROCEDURE — 99204 OFFICE O/P NEW MOD 45 MIN: CPT | Performed by: INTERNAL MEDICINE

## 2023-04-25 RX ORDER — ERGOCALCIFEROL 1.25 MG/1
50000 CAPSULE ORAL DAILY
COMMUNITY

## 2023-04-25 NOTE — PROGRESS NOTES
"CHIEF COMPLAINT/ HPI:  Establish Care    Says he wants to lose wgt  Needs primary md     Ears ringing a lot, on and off daily, started oct 2022    Inguinal discomfort ? Hernia    Patient also had questions about lung cancer screening for history of tobacco use he quit about a year or so ago, discussed low-dose CT scan and screening options,    Objective   Vital Signs  Vitals:    04/25/23 0930   BP: 143/88   Pulse: 93   Temp: 97.1 °F (36.2 °C)   SpO2: 97%   Weight: 124 kg (274 lb 6.4 oz)   Height: 185.4 cm (72.99\")      Body mass index is 36.21 kg/m².  Review of Systems   Constitutional: Negative.    HENT: Negative.    Eyes: Negative.    Respiratory: Negative.    Cardiovascular: Negative.    Gastrointestinal: Negative.    Endocrine: Negative.    Genitourinary: Negative.    Musculoskeletal: Negative.    Allergic/Immunologic: Negative.    Neurological: Negative.    Hematological: Negative.    Psychiatric/Behavioral: Negative.       Physical Exam  Constitutional:       General: He is not in acute distress.     Appearance: Normal appearance.   HENT:      Head: Normocephalic.      Mouth/Throat:      Mouth: Mucous membranes are moist.   Eyes:      Conjunctiva/sclera: Conjunctivae normal.      Pupils: Pupils are equal, round, and reactive to light.   Cardiovascular:      Rate and Rhythm: Normal rate and regular rhythm.      Pulses: Normal pulses.      Heart sounds: Normal heart sounds.   Pulmonary:      Effort: Pulmonary effort is normal.      Breath sounds: Normal breath sounds.   Abdominal:      General: Abdomen is flat. Bowel sounds are normal.      Palpations: Abdomen is soft.   Musculoskeletal:         General: No swelling. Normal range of motion.      Cervical back: Neck supple.   Skin:     General: Skin is warm and dry.      Coloration: Skin is not jaundiced.   Neurological:      General: No focal deficit present.      Mental Status: He is alert and oriented to person, place, and time. Mental status is at baseline. "   Psychiatric:         Mood and Affect: Mood normal.         Behavior: Behavior normal.         Thought Content: Thought content normal.         Judgment: Judgment normal.        Result Review :   No results found for: PROBNP, BNP  CMP        11/4/2022    08:53   CMP   Glucose 98     BUN 15     Creatinine 1.03     EGFR 85.3     Sodium 138     Potassium 4.3     Chloride 101     Calcium 9.7     Total Protein 7.2     Albumin 4.60     Globulin 2.6     Total Bilirubin 0.5     Alkaline Phosphatase 51     AST (SGOT) 17     ALT (SGPT) 20     Albumin/Globulin Ratio 1.8     BUN/Creatinine Ratio 14.6     Anion Gap 10.0       CBC w/diff        11/4/2022    08:53   CBC w/Diff   WBC 5.26     RBC 5.39     Hemoglobin 16.8     Hematocrit 48.4     MCV 89.8     MCH 31.2     MCHC 34.7     RDW 12.8     Platelets 236     Neutrophil Rel % 56.6     Immature Granulocyte Rel % 0.4     Lymphocyte Rel % 30.0     Monocyte Rel % 10.3     Eosinophil Rel % 2.3     Basophil Rel % 0.4        Lipid Panel        11/4/2022    08:53   Lipid Panel   Total Cholesterol 217     Triglycerides 178     HDL Cholesterol 63     VLDL Cholesterol 31     LDL Cholesterol  123     LDL/HDL Ratio 1.88        Lab Results   Component Value Date    TSH 2.460 11/04/2022    TSH 4.810 (H) 04/18/2022    TSH 2.810 10/05/2021      Lab Results   Component Value Date    FREET4 1.57 11/04/2022    FREET4 1.30 04/18/2022    FREET4 1.3 03/26/2021      A1C Last 3 Results        11/4/2022    08:53   HGBA1C Last 3 Results   Hemoglobin A1C 5.40                         Visit Diagnoses:    ICD-10-CM ICD-9-CM   1. Tinnitus of both ears  H93.13 388.30   2. Hypothyroidism due to Hashimoto's thyroiditis  E03.8 244.8    E06.3 245.2   3. Cramps, muscle, general  R25.2 729.82   4. Deep inguinal pain, left  R10.32 789.04   5. Bilateral carpal tunnel syndrome  G56.03 354.0   6. Screening PSA (prostate specific antigen)  Z12.5 V76.44   7. Hypogonadism in male  E29.1 257.2   8. Tobacco use disorder,  moderate, in early remission, dependence  F17.201 305.1       Assessment and Plan   Diagnoses and all orders for this visit:    1. Tinnitus of both ears (Primary)  -     Comprehensive Metabolic Panel; Future  -     CBC & Differential; Future  -     Lipid Panel; Future  -     PSA Screen; Future  -     Testosterone, Free, Total; Future  -     Cancel:  CT Chest Low Dose Cancer Screening WO; Future  -      CT Chest Low Dose Cancer Screening WO; Future    2. Hypothyroidism due to Hashimoto's thyroiditis  -     Comprehensive Metabolic Panel; Future  -     CBC & Differential; Future  -     Lipid Panel; Future  -     PSA Screen; Future  -     Testosterone, Free, Total; Future  -     Cancel:  CT Chest Low Dose Cancer Screening WO; Future  -      CT Chest Low Dose Cancer Screening WO; Future    3. Cramps, muscle, general  -     Comprehensive Metabolic Panel; Future  -     CBC & Differential; Future  -     Lipid Panel; Future  -     PSA Screen; Future  -     Testosterone, Free, Total; Future  -     Cancel:  CT Chest Low Dose Cancer Screening WO; Future  -      CT Chest Low Dose Cancer Screening WO; Future    4. Deep inguinal pain, left  -     Comprehensive Metabolic Panel; Future  -     CBC & Differential; Future  -     Lipid Panel; Future  -     PSA Screen; Future  -     Testosterone, Free, Total; Future  -     Cancel:  CT Chest Low Dose Cancer Screening WO; Future  -      CT Chest Low Dose Cancer Screening WO; Future    5. Bilateral carpal tunnel syndrome  -     Comprehensive Metabolic Panel; Future  -     CBC & Differential; Future  -     Lipid Panel; Future  -     PSA Screen; Future  -     Testosterone, Free, Total; Future  -     Cancel:  CT Chest Low Dose Cancer Screening WO; Future  -      CT Chest Low Dose Cancer Screening WO; Future    6. Screening PSA (prostate specific antigen)  -     Comprehensive Metabolic Panel; Future  -     CBC & Differential; Future  -     Lipid Panel; Future  -     PSA Screen; Future  -      Testosterone, Free, Total; Future  -     Cancel:  CT Chest Low Dose Cancer Screening WO; Future  -      CT Chest Low Dose Cancer Screening WO; Future    7. Hypogonadism in male  -     Testosterone, Free, Total; Future  -     Cancel:  CT Chest Low Dose Cancer Screening WO; Future  -      CT Chest Low Dose Cancer Screening WO; Future    8. Tobacco use disorder, moderate, in early remission, dependence             Palpitations---    Tobacco abuse, discussed has quit about a year ago, will set up a CT scan for low-dose screening,    Hypothyoridism, cont levothyroxine     l inguinal discomfort     overwgt    tinnitis     Neuropathy fingers carpal tunnel?,    Colonoscopy feb 2023, micheline --normal , next one 5 yrs     Hypogonadism previous values from October 2021 reviewed--- we will recheck again      Follow Up   No follow-ups on file.  Patient was given instructions and counseling regarding his condition or for health maintenance advice. Please see specific information pulled into the AVS if appropriate.

## 2023-05-17 ENCOUNTER — TELEPHONE (OUTPATIENT)
Dept: INTERNAL MEDICINE | Facility: CLINIC | Age: 58
End: 2023-05-17
Payer: COMMERCIAL

## 2023-08-25 ENCOUNTER — TELEPHONE (OUTPATIENT)
Dept: INTERNAL MEDICINE | Facility: CLINIC | Age: 58
End: 2023-08-25

## 2023-08-25 DIAGNOSIS — E03.8 HYPOTHYROIDISM DUE TO HASHIMOTO'S THYROIDITIS: Primary | ICD-10-CM

## 2023-08-25 DIAGNOSIS — E06.3 HYPOTHYROIDISM DUE TO HASHIMOTO'S THYROIDITIS: Primary | ICD-10-CM

## 2023-08-25 RX ORDER — LEVOTHYROXINE SODIUM 0.07 MG/1
75 TABLET ORAL
Qty: 14 TABLET | Refills: 0 | Status: SHIPPED | OUTPATIENT
Start: 2023-08-25 | End: 2023-10-25 | Stop reason: SDUPTHER

## 2023-08-25 NOTE — TELEPHONE ENCOUNTER
Okay to send in the prescription for his thyroid medication, please send it into the pharmacy that he wanted to go to in [place], let him know, send it in and exactly what he was taking before give him a 2-week supply or #14 no refills

## 2023-08-25 NOTE — TELEPHONE ENCOUNTER
"    Caller: Kevin Sampson \"Cordell\"    Relationship to patient: Self    Best call back number: [phone]    Patient is needing: PATIENT CALLED STATING HE IS CURRENTLY IN [place] AND FORGET HIS PRESCRIPTION FOR LEVOTHYROXINE AT HOME. PATIENT IS WANTING TO KNOW IF A WEEK OR TWO WEEKS COULD BE CALLED INTO THE [facility] IN [facility] UNTIL HE IS ABLE TO COME BACK TO [place].     "

## 2023-10-18 ENCOUNTER — LAB (OUTPATIENT)
Dept: LAB | Facility: HOSPITAL | Age: 58
End: 2023-10-18
Payer: COMMERCIAL

## 2023-10-18 DIAGNOSIS — Z12.5 SCREENING PSA (PROSTATE SPECIFIC ANTIGEN): ICD-10-CM

## 2023-10-18 DIAGNOSIS — E03.8 HYPOTHYROIDISM DUE TO HASHIMOTO'S THYROIDITIS: ICD-10-CM

## 2023-10-18 DIAGNOSIS — R10.32 DEEP INGUINAL PAIN, LEFT: ICD-10-CM

## 2023-10-18 DIAGNOSIS — E29.1 HYPOGONADISM IN MALE: ICD-10-CM

## 2023-10-18 DIAGNOSIS — R25.2 CRAMPS, MUSCLE, GENERAL: ICD-10-CM

## 2023-10-18 DIAGNOSIS — G56.03 BILATERAL CARPAL TUNNEL SYNDROME: ICD-10-CM

## 2023-10-18 DIAGNOSIS — E06.3 HYPOTHYROIDISM DUE TO HASHIMOTO'S THYROIDITIS: ICD-10-CM

## 2023-10-18 DIAGNOSIS — H93.13 TINNITUS OF BOTH EARS: ICD-10-CM

## 2023-10-18 LAB
ALBUMIN SERPL-MCNC: 4.6 G/DL (ref 3.5–5.2)
ALBUMIN/GLOB SERPL: 1.8 G/DL
ALP SERPL-CCNC: 50 U/L (ref 39–117)
ALT SERPL W P-5'-P-CCNC: 27 U/L (ref 1–41)
ANION GAP SERPL CALCULATED.3IONS-SCNC: 9 MMOL/L (ref 5–15)
AST SERPL-CCNC: 18 U/L (ref 1–40)
BASOPHILS # BLD AUTO: 0.02 10*3/MM3 (ref 0–0.2)
BASOPHILS NFR BLD AUTO: 0.5 % (ref 0–1.5)
BILIRUB SERPL-MCNC: 0.4 MG/DL (ref 0–1.2)
BUN SERPL-MCNC: 18 MG/DL (ref 6–20)
BUN/CREAT SERPL: 17.8 (ref 7–25)
CALCIUM SPEC-SCNC: 9.6 MG/DL (ref 8.6–10.5)
CHLORIDE SERPL-SCNC: 101 MMOL/L (ref 98–107)
CHOLEST SERPL-MCNC: 229 MG/DL (ref 0–200)
CO2 SERPL-SCNC: 29 MMOL/L (ref 22–29)
CREAT SERPL-MCNC: 1.01 MG/DL (ref 0.76–1.27)
DEPRECATED RDW RBC AUTO: 42.1 FL (ref 37–54)
EGFRCR SERPLBLD CKD-EPI 2021: 86.7 ML/MIN/1.73
EOSINOPHIL # BLD AUTO: 0.09 10*3/MM3 (ref 0–0.4)
EOSINOPHIL NFR BLD AUTO: 2.1 % (ref 0.3–6.2)
ERYTHROCYTE [DISTWIDTH] IN BLOOD BY AUTOMATED COUNT: 13.1 % (ref 12.3–15.4)
GLOBULIN UR ELPH-MCNC: 2.6 GM/DL
GLUCOSE SERPL-MCNC: 98 MG/DL (ref 65–99)
HCT VFR BLD AUTO: 44.6 % (ref 37.5–51)
HDLC SERPL-MCNC: 62 MG/DL (ref 40–60)
HGB BLD-MCNC: 15.4 G/DL (ref 13–17.7)
IMM GRANULOCYTES # BLD AUTO: 0.01 10*3/MM3 (ref 0–0.05)
IMM GRANULOCYTES NFR BLD AUTO: 0.2 % (ref 0–0.5)
LDLC SERPL CALC-MCNC: 139 MG/DL (ref 0–100)
LDLC/HDLC SERPL: 2.19 {RATIO}
LYMPHOCYTES # BLD AUTO: 1.8 10*3/MM3 (ref 0.7–3.1)
LYMPHOCYTES NFR BLD AUTO: 41.2 % (ref 19.6–45.3)
MCH RBC QN AUTO: 30.6 PG (ref 26.6–33)
MCHC RBC AUTO-ENTMCNC: 34.5 G/DL (ref 31.5–35.7)
MCV RBC AUTO: 88.7 FL (ref 79–97)
MONOCYTES # BLD AUTO: 0.54 10*3/MM3 (ref 0.1–0.9)
MONOCYTES NFR BLD AUTO: 12.4 % (ref 5–12)
NEUTROPHILS NFR BLD AUTO: 1.91 10*3/MM3 (ref 1.7–7)
NEUTROPHILS NFR BLD AUTO: 43.6 % (ref 42.7–76)
NRBC BLD AUTO-RTO: 0 /100 WBC (ref 0–0.2)
PLATELET # BLD AUTO: 206 10*3/MM3 (ref 140–450)
PMV BLD AUTO: 9.4 FL (ref 6–12)
POTASSIUM SERPL-SCNC: 4.5 MMOL/L (ref 3.5–5.2)
PROT SERPL-MCNC: 7.2 G/DL (ref 6–8.5)
PSA SERPL-MCNC: 0.67 NG/ML (ref 0–4)
RBC # BLD AUTO: 5.03 10*6/MM3 (ref 4.14–5.8)
SODIUM SERPL-SCNC: 139 MMOL/L (ref 136–145)
TRIGL SERPL-MCNC: 157 MG/DL (ref 0–150)
VLDLC SERPL-MCNC: 28 MG/DL (ref 5–40)
WBC NRBC COR # BLD: 4.37 10*3/MM3 (ref 3.4–10.8)

## 2023-10-18 PROCEDURE — 80061 LIPID PANEL: CPT

## 2023-10-18 PROCEDURE — 84403 ASSAY OF TOTAL TESTOSTERONE: CPT

## 2023-10-18 PROCEDURE — G0103 PSA SCREENING: HCPCS

## 2023-10-18 PROCEDURE — 85025 COMPLETE CBC W/AUTO DIFF WBC: CPT

## 2023-10-18 PROCEDURE — 80053 COMPREHEN METABOLIC PANEL: CPT

## 2023-10-18 PROCEDURE — 36415 COLL VENOUS BLD VENIPUNCTURE: CPT

## 2023-10-18 PROCEDURE — 84402 ASSAY OF FREE TESTOSTERONE: CPT

## 2023-10-24 LAB
TESTOST FREE SERPL-MCNC: 6.1 PG/ML (ref 7.2–24)
TESTOST SERPL-MCNC: 216 NG/DL (ref 264–916)

## 2023-10-25 ENCOUNTER — OFFICE VISIT (OUTPATIENT)
Dept: INTERNAL MEDICINE | Facility: CLINIC | Age: 58
End: 2023-10-25
Payer: COMMERCIAL

## 2023-10-25 VITALS
HEART RATE: 91 BPM | WEIGHT: 271 LBS | SYSTOLIC BLOOD PRESSURE: 157 MMHG | DIASTOLIC BLOOD PRESSURE: 96 MMHG | BODY MASS INDEX: 35.92 KG/M2 | TEMPERATURE: 98.2 F | HEIGHT: 73 IN | OXYGEN SATURATION: 98 %

## 2023-10-25 DIAGNOSIS — E06.3 HYPOTHYROIDISM DUE TO HASHIMOTO'S THYROIDITIS: ICD-10-CM

## 2023-10-25 DIAGNOSIS — Z12.5 SCREENING PSA (PROSTATE SPECIFIC ANTIGEN): ICD-10-CM

## 2023-10-25 DIAGNOSIS — E29.1 HYPOGONADISM IN MALE: ICD-10-CM

## 2023-10-25 DIAGNOSIS — F17.201 TOBACCO USE DISORDER, MODERATE, IN EARLY REMISSION, DEPENDENCE: Primary | ICD-10-CM

## 2023-10-25 DIAGNOSIS — E03.8 HYPOTHYROIDISM DUE TO HASHIMOTO'S THYROIDITIS: ICD-10-CM

## 2023-10-25 DIAGNOSIS — H93.13 TINNITUS OF BOTH EARS: ICD-10-CM

## 2023-10-25 PROCEDURE — 99214 OFFICE O/P EST MOD 30 MIN: CPT | Performed by: INTERNAL MEDICINE

## 2023-10-25 RX ORDER — LEVOTHYROXINE SODIUM 0.07 MG/1
75 TABLET ORAL
Qty: 14 TABLET | Refills: 0 | Status: SHIPPED | OUTPATIENT
Start: 2023-10-25

## 2023-10-25 NOTE — PROGRESS NOTES
"CHIEF COMPLAINT/ HPI:  Hypothyroidism (Routine follow up, lab follow up. Medication refill. Left shoulder pain for 4-6 weeks, taking biofreeze and advil. )              Objective   Vital Signs  Vitals:    10/25/23 1140   BP: 157/96   Pulse: 91   Temp: 98.2 °F (36.8 °C)   SpO2: 98%   Weight: 123 kg (271 lb)   Height: 185.4 cm (72.99\")      Body mass index is 35.76 kg/m².  Review of Systems   Constitutional: Negative.    HENT: Negative.     Eyes: Negative.    Respiratory: Negative.     Cardiovascular: Negative.    Gastrointestinal: Negative.    Endocrine: Negative.    Genitourinary: Negative.    Musculoskeletal:  Positive for arthralgias and myalgias.   Allergic/Immunologic: Negative.    Neurological: Negative.    Hematological: Negative.    Psychiatric/Behavioral: Negative.        Physical Exam  Constitutional:       General: He is not in acute distress.     Appearance: Normal appearance.   HENT:      Head: Normocephalic.      Mouth/Throat:      Mouth: Mucous membranes are moist.   Eyes:      Conjunctiva/sclera: Conjunctivae normal.      Pupils: Pupils are equal, round, and reactive to light.   Cardiovascular:      Rate and Rhythm: Normal rate and regular rhythm.      Pulses: Normal pulses.      Heart sounds: Normal heart sounds.   Pulmonary:      Effort: Pulmonary effort is normal.      Breath sounds: Normal breath sounds.   Abdominal:      General: Bowel sounds are normal.      Palpations: Abdomen is soft.   Musculoskeletal:         General: No swelling. Normal range of motion.      Cervical back: Neck supple.   Skin:     General: Skin is warm and dry.      Coloration: Skin is not jaundiced.   Neurological:      General: No focal deficit present.      Mental Status: He is alert and oriented to person, place, and time. Mental status is at baseline.   Psychiatric:         Mood and Affect: Mood normal.         Behavior: Behavior normal.         Thought Content: Thought content normal.         Judgment: Judgment " "normal.     Patient has trace edema to the PreTAB regions     bilateral mild focal left trapezius tenderness no skin rash noted,  Result Review :   No results found for: \"PROBNP\", \"BNP\"  CMP          11/4/2022    08:53 10/18/2023    07:38   CMP   Glucose 98  98    BUN 15  18    Creatinine 1.03  1.01    EGFR 85.3  86.7    Sodium 138  139    Potassium 4.3  4.5    Chloride 101  101    Calcium 9.7  9.6    Total Protein 7.2  7.2    Albumin 4.60  4.6    Globulin 2.6  2.6    Total Bilirubin 0.5  0.4    Alkaline Phosphatase 51  50    AST (SGOT) 17  18    ALT (SGPT) 20  27    Albumin/Globulin Ratio 1.8  1.8    BUN/Creatinine Ratio 14.6  17.8    Anion Gap 10.0  9.0      CBC w/diff          11/4/2022    08:53 10/18/2023    07:38   CBC w/Diff   WBC 5.26  4.37    RBC 5.39  5.03    Hemoglobin 16.8  15.4    Hematocrit 48.4  44.6    MCV 89.8  88.7    MCH 31.2  30.6    MCHC 34.7  34.5    RDW 12.8  13.1    Platelets 236  206    Neutrophil Rel % 56.6  43.6    Immature Granulocyte Rel % 0.4  0.2    Lymphocyte Rel % 30.0  41.2    Monocyte Rel % 10.3  12.4    Eosinophil Rel % 2.3  2.1    Basophil Rel % 0.4  0.5       Lipid Panel          11/4/2022    08:53 10/18/2023    07:38   Lipid Panel   Total Cholesterol 217  229    Triglycerides 178  157    HDL Cholesterol 63  62    VLDL Cholesterol 31  28    LDL Cholesterol  123  139    LDL/HDL Ratio 1.88  2.19       Lab Results   Component Value Date    TSH 2.460 11/04/2022    TSH 4.810 (H) 04/18/2022    TSH 2.810 10/05/2021      Lab Results   Component Value Date    FREET4 1.57 11/04/2022    FREET4 1.30 04/18/2022    FREET4 1.3 03/26/2021      A1C Last 3 Results          11/4/2022    08:53   HGBA1C Last 3 Results   Hemoglobin A1C 5.40       PSA          10/18/2023    07:38   PSA   PSA 0.665                     Visit Diagnoses:    ICD-10-CM ICD-9-CM   1. Tobacco use disorder, moderate, in early remission, dependence  F17.201 305.1   2. Hypothyroidism due to Hashimoto's thyroiditis  E03.8 244.8    " E06.3 245.2   3. Screening PSA (prostate specific antigen)  Z12.5 V76.44   4. Hypogonadism in male  E29.1 257.2   5. Tinnitus of both ears  H93.13 388.30       Assessment and Plan   Diagnoses and all orders for this visit:    1. Tobacco use disorder, moderate, in early remission, dependence (Primary)  -     XR Shoulder 2+ View Left; Future  -     XR Spine Thoracic 3 View; Future  -     XR Chest PA & Lateral; Future    2. Hypothyroidism due to Hashimoto's thyroiditis  -     levothyroxine (SYNTHROID, LEVOTHROID) 75 MCG tablet; Take 1 tablet by mouth Every Morning.  Dispense: 14 tablet; Refill: 0  -     TSH+Free T4; Future  -     Lipid Panel; Future  -     Comprehensive Metabolic Panel; Future  -     XR Shoulder 2+ View Left; Future  -     XR Spine Thoracic 3 View; Future  -     XR Chest PA & Lateral; Future    3. Screening PSA (prostate specific antigen)  -     XR Shoulder 2+ View Left; Future  -     XR Spine Thoracic 3 View; Future  -     XR Chest PA & Lateral; Future    4. Hypogonadism in male  -     XR Shoulder 2+ View Left; Future  -     XR Spine Thoracic 3 View; Future  -     XR Chest PA & Lateral; Future    5. Tinnitus of both ears  -     XR Shoulder 2+ View Left; Future  -     XR Spine Thoracic 3 View; Future  -     XR Chest PA & Lateral; Future        Class 2 Severe Obesity (BMI >=35 and <=39.9). Obesity-related health conditions include the following: dyslipidemias. Obesity is unchanged. BMI is is above average; BMI management plan is completed. We discussed low calorie, low carb based diet program, portion control, and increasing exercise.     Palpitations---    Tobacco abuse, discussed has quit about a year ago, will set up a CT scan for low-dose screening, patient is currently waiting on this test, October 25, 2023    Hypothyoridism, cont levothyroxine 75 mcg daily    l inguinal discomfort     overwgt    tinnitis     Neuropathy fingers carpal tunnel?,    Colonoscopy feb 2023, micheline --normal , next one 5  yrs     Hypogonadism previous values from October 2021 reviewed--- we will recheck again--treatment options with AndroGel or Depo testosterone discussed briefly, October 25, 2023,        Follow Up   No follow-ups on file.  Patient was given instructions and counseling regarding his condition or for health maintenance advice. Please see specific information pulled into the AVS if appropriate.         Answers submitted by the patient for this visit:  Primary Reason for Visit (Submitted on 10/18/2023)  What is the primary reason for your visit?: Physical

## 2023-11-16 ENCOUNTER — TELEPHONE (OUTPATIENT)
Dept: INTERNAL MEDICINE | Facility: CLINIC | Age: 58
End: 2023-11-16
Payer: COMMERCIAL

## 2023-11-16 DIAGNOSIS — E06.3 HYPOTHYROIDISM DUE TO HASHIMOTO'S THYROIDITIS: ICD-10-CM

## 2023-11-16 DIAGNOSIS — E03.8 HYPOTHYROIDISM DUE TO HASHIMOTO'S THYROIDITIS: ICD-10-CM

## 2023-11-16 RX ORDER — LEVOTHYROXINE SODIUM 0.07 MG/1
75 TABLET ORAL
Qty: 14 TABLET | Refills: 0 | Status: SHIPPED | OUTPATIENT
Start: 2023-11-16

## 2023-11-16 NOTE — TELEPHONE ENCOUNTER
"    Caller: Kevin Sampson \"Cordell\"    Relationship: Self    Best call back number: [phone]    Requested Prescriptions:   Requested Prescriptions     Pending Prescriptions Disp Refills    levothyroxine (SYNTHROID, LEVOTHROID) 75 MCG tablet 14 tablet 0     Sig: Take 1 tablet by mouth Every Morning.        Pharmacy where request should be sent: [address] - [phone]  - [phone]     Last office visit with prescribing clinician: 10/25/2023   Last telemedicine visit with prescribing clinician: Visit date not found   Next office visit with prescribing clinician: 4/26/2024       Does the patient have less than a 3 day supply:  [x] Yes  [] No      Dave Magallanes Rep   11/16/23 15:58 EST           "

## 2023-11-21 DIAGNOSIS — E06.3 HYPOTHYROIDISM DUE TO HASHIMOTO'S THYROIDITIS: ICD-10-CM

## 2023-11-21 DIAGNOSIS — E03.8 HYPOTHYROIDISM DUE TO HASHIMOTO'S THYROIDITIS: ICD-10-CM

## 2023-11-21 RX ORDER — LEVOTHYROXINE SODIUM 0.07 MG/1
75 TABLET ORAL
Qty: 90 TABLET | Refills: 3 | Status: SHIPPED | OUTPATIENT
Start: 2023-11-21

## 2023-11-21 NOTE — TELEPHONE ENCOUNTER
"    Caller: Kevin Sampson \"Cordell\"    Relationship: Self    Best call back number: [phone]     Requested Prescriptions:   Requested Prescriptions     Pending Prescriptions Disp Refills    levothyroxine (SYNTHROID, LEVOTHROID) 75 MCG tablet 14 tablet 0     Sig: Take 1 tablet by mouth Every Morning.        Pharmacy where request should be sent: [address] - [phone]  - [phone] [facility]     Last office visit with prescribing clinician: 10/25/2023   Last telemedicine visit with prescribing clinician: Visit date not found   Next office visit with prescribing clinician: 4/26/2024     Additional details provided by patient: PATIENT STATED HE ONLY GOT A TWO WEEKS WORTH OF MEDICATION AND REQUESTING A YEAR SUPPLY.    PLEASE ADVISE     Does the patient have less than a 3 day supply:  [] Yes  [x] No    Would you like a call back once the refill request has been completed: [x] Yes [] No    If the office needs to give you a call back, can they leave a voicemail: [x] Yes [] No    Dave Weaver Rep   11/21/23 11:05 EST         "

## 2024-04-15 ENCOUNTER — TELEPHONE (OUTPATIENT)
Dept: INTERNAL MEDICINE | Age: 59
End: 2024-04-15
Payer: COMMERCIAL

## 2024-04-15 NOTE — TELEPHONE ENCOUNTER
Pt requesting all imaging orders be sent to [facility].    CT CHEST LOW DOSE CANCER SCREENING WO chest referral was re-opened and is now pending for authorization to be sent to [facility]. He said that he will contact them himself to schedule an appointment as it would be easier for him vs having us do it bc of his schedule.    All X-Ray orders (Chest PA & LATERAL, SPINE THORACIC 3VW, & SHOULDER 2+ VW LEFT ) will be faxed over to [facility] as well.    Will notify pt once all have been sent to [place].

## 2024-04-24 ENCOUNTER — LAB (OUTPATIENT)
Dept: LAB | Facility: HOSPITAL | Age: 59
End: 2024-04-24
Payer: COMMERCIAL

## 2024-04-24 DIAGNOSIS — E03.8 HYPOTHYROIDISM DUE TO HASHIMOTO'S THYROIDITIS: ICD-10-CM

## 2024-04-24 DIAGNOSIS — E06.3 HYPOTHYROIDISM DUE TO HASHIMOTO'S THYROIDITIS: ICD-10-CM

## 2024-04-24 LAB
ALBUMIN SERPL-MCNC: 4.6 G/DL (ref 3.5–5.2)
ALBUMIN/GLOB SERPL: 1.6 G/DL
ALP SERPL-CCNC: 56 U/L (ref 39–117)
ALT SERPL W P-5'-P-CCNC: 24 U/L (ref 1–41)
ANION GAP SERPL CALCULATED.3IONS-SCNC: 10.9 MMOL/L (ref 5–15)
AST SERPL-CCNC: 22 U/L (ref 1–40)
BILIRUB SERPL-MCNC: 0.5 MG/DL (ref 0–1.2)
BUN SERPL-MCNC: 18 MG/DL (ref 6–20)
BUN/CREAT SERPL: 17.6 (ref 7–25)
CALCIUM SPEC-SCNC: 9.7 MG/DL (ref 8.6–10.5)
CHLORIDE SERPL-SCNC: 100 MMOL/L (ref 98–107)
CHOLEST SERPL-MCNC: 221 MG/DL (ref 0–200)
CO2 SERPL-SCNC: 29.1 MMOL/L (ref 22–29)
CREAT SERPL-MCNC: 1.02 MG/DL (ref 0.76–1.27)
EGFRCR SERPLBLD CKD-EPI 2021: 85.2 ML/MIN/1.73
GLOBULIN UR ELPH-MCNC: 2.9 GM/DL
GLUCOSE SERPL-MCNC: 92 MG/DL (ref 65–99)
HDLC SERPL-MCNC: 61 MG/DL (ref 40–60)
LDLC SERPL CALC-MCNC: 125 MG/DL (ref 0–100)
LDLC/HDLC SERPL: 1.96 {RATIO}
POTASSIUM SERPL-SCNC: 4.3 MMOL/L (ref 3.5–5.2)
PROT SERPL-MCNC: 7.5 G/DL (ref 6–8.5)
SODIUM SERPL-SCNC: 140 MMOL/L (ref 136–145)
T4 FREE SERPL-MCNC: 1.36 NG/DL (ref 0.93–1.7)
TRIGL SERPL-MCNC: 202 MG/DL (ref 0–150)
TSH SERPL DL<=0.05 MIU/L-ACNC: 4.7 UIU/ML (ref 0.27–4.2)
VLDLC SERPL-MCNC: 35 MG/DL (ref 5–40)

## 2024-04-24 PROCEDURE — 84439 ASSAY OF FREE THYROXINE: CPT

## 2024-04-24 PROCEDURE — 80053 COMPREHEN METABOLIC PANEL: CPT

## 2024-04-24 PROCEDURE — 80061 LIPID PANEL: CPT

## 2024-04-24 PROCEDURE — 36415 COLL VENOUS BLD VENIPUNCTURE: CPT

## 2024-04-24 PROCEDURE — 84443 ASSAY THYROID STIM HORMONE: CPT

## 2024-04-26 ENCOUNTER — OFFICE VISIT (OUTPATIENT)
Dept: INTERNAL MEDICINE | Age: 59
End: 2024-04-26
Payer: COMMERCIAL

## 2024-04-26 VITALS
OXYGEN SATURATION: 96 % | DIASTOLIC BLOOD PRESSURE: 84 MMHG | BODY MASS INDEX: 35.52 KG/M2 | HEIGHT: 73 IN | WEIGHT: 268 LBS | SYSTOLIC BLOOD PRESSURE: 143 MMHG | TEMPERATURE: 97.8 F | HEART RATE: 73 BPM

## 2024-04-26 DIAGNOSIS — E03.8 HYPOTHYROIDISM DUE TO HASHIMOTO'S THYROIDITIS: Primary | ICD-10-CM

## 2024-04-26 DIAGNOSIS — Z86.010 HX OF COLONIC POLYP: ICD-10-CM

## 2024-04-26 DIAGNOSIS — E06.3 HYPOTHYROIDISM DUE TO HASHIMOTO'S THYROIDITIS: Primary | ICD-10-CM

## 2024-04-26 DIAGNOSIS — Z12.5 SCREENING PSA (PROSTATE SPECIFIC ANTIGEN): ICD-10-CM

## 2024-04-26 DIAGNOSIS — F17.201 TOBACCO USE DISORDER, MODERATE, IN EARLY REMISSION, DEPENDENCE: ICD-10-CM

## 2024-04-26 DIAGNOSIS — E29.1 HYPOGONADISM IN MALE: ICD-10-CM

## 2024-04-26 RX ORDER — ROSUVASTATIN CALCIUM 10 MG/1
10 TABLET, COATED ORAL NIGHTLY
Qty: 90 TABLET | Refills: 3 | Status: SHIPPED | OUTPATIENT
Start: 2024-04-26

## 2024-04-26 RX ORDER — THIAMINE HCL 100 MG
1 TABLET ORAL DAILY
COMMUNITY

## 2024-04-26 RX ORDER — LEVOTHYROXINE SODIUM 0.07 MG/1
75 TABLET ORAL
Qty: 90 TABLET | Refills: 3 | Status: SHIPPED | OUTPATIENT
Start: 2024-04-26

## 2024-04-26 NOTE — PROGRESS NOTES
"CHIEF COMPLAINT/ HPI: Patient is here for his annual checkup, he is doing well no new issues, here to review labs  Annual Exam (Annual exam, Lab follow up, Imaging follow up. Pt states back pain. )    Patient's preventive measures discussed, we discussed tobacco use, recommend quitting, we discussed alcohol use, he wears a seatbelt we encouraged activity with aerobic activity such as walking etc., he is to eat a healthy diet,          Objective   Vital Signs  Vitals:    04/26/24 0826   BP: 143/84   Pulse: 73   Temp: 97.8 °F (36.6 °C)   SpO2: 96%   Weight: 122 kg (268 lb)   Height: 185.4 cm (72.99\")      Body mass index is 35.37 kg/m².  Review of Systems   Constitutional: Negative.    HENT: Negative.     Eyes: Negative.    Respiratory: Negative.     Cardiovascular: Negative.    Gastrointestinal: Negative.    Endocrine: Negative.    Genitourinary: Negative.    Musculoskeletal: Negative.    Allergic/Immunologic: Negative.    Neurological: Negative.    Hematological: Negative.    Psychiatric/Behavioral: Negative.        Physical Exam  Constitutional:       General: He is not in acute distress.     Appearance: Normal appearance. He is obese.   HENT:      Head: Normocephalic.      Mouth/Throat:      Mouth: Mucous membranes are moist.   Eyes:      Conjunctiva/sclera: Conjunctivae normal.      Pupils: Pupils are equal, round, and reactive to light.   Cardiovascular:      Rate and Rhythm: Normal rate and regular rhythm.      Pulses: Normal pulses.      Heart sounds: Normal heart sounds.   Pulmonary:      Effort: Pulmonary effort is normal.      Breath sounds: Normal breath sounds.   Abdominal:      General: Bowel sounds are normal.      Palpations: Abdomen is soft.   Musculoskeletal:         General: No swelling. Normal range of motion.      Cervical back: Neck supple.   Skin:     General: Skin is warm and dry.      Coloration: Skin is not jaundiced.   Neurological:      General: No focal deficit present.      Mental Status: " "He is alert and oriented to person, place, and time. Mental status is at baseline.   Psychiatric:         Mood and Affect: Mood normal.         Behavior: Behavior normal.         Thought Content: Thought content normal.         Judgment: Judgment normal.        Result Review :   No results found for: \"PROBNP\", \"BNP\"  CMP          10/18/2023    07:38 4/24/2024    06:58   CMP   Glucose 98  92    BUN 18  18    Creatinine 1.01  1.02    EGFR 86.7  85.2    Sodium 139  140    Potassium 4.5  4.3    Chloride 101  100    Calcium 9.6  9.7    Total Protein 7.2  7.5    Albumin 4.6  4.6    Globulin 2.6  2.9    Total Bilirubin 0.4  0.5    Alkaline Phosphatase 50  56    AST (SGOT) 18  22    ALT (SGPT) 27  24    Albumin/Globulin Ratio 1.8  1.6    BUN/Creatinine Ratio 17.8  17.6    Anion Gap 9.0  10.9      CBC w/diff          10/18/2023    07:38   CBC w/Diff   WBC 4.37    RBC 5.03    Hemoglobin 15.4    Hematocrit 44.6    MCV 88.7    MCH 30.6    MCHC 34.5    RDW 13.1    Platelets 206    Neutrophil Rel % 43.6    Immature Granulocyte Rel % 0.2    Lymphocyte Rel % 41.2    Monocyte Rel % 12.4    Eosinophil Rel % 2.1    Basophil Rel % 0.5       Lipid Panel          10/18/2023    07:38 4/24/2024    06:58   Lipid Panel   Total Cholesterol 229  221    Triglycerides 157  202    HDL Cholesterol 62  61    VLDL Cholesterol 28  35    LDL Cholesterol  139  125    LDL/HDL Ratio 2.19  1.96       Lab Results   Component Value Date    TSH 4.700 (H) 04/24/2024    TSH 2.460 11/04/2022    TSH 4.810 (H) 04/18/2022      Lab Results   Component Value Date    FREET4 1.36 04/24/2024    FREET4 1.57 11/04/2022    FREET4 1.30 04/18/2022         PSA          10/18/2023    07:38   PSA   PSA 0.665                     Visit Diagnoses:    ICD-10-CM ICD-9-CM   1. Hypothyroidism due to Hashimoto's thyroiditis  E03.8 244.8    E06.3 245.2   2. Hypogonadism in male  E29.1 257.2   3. Screening PSA (prostate specific antigen)  Z12.5 V76.44   4. Tobacco use disorder, " moderate, in early remission, dependence  F17.201 305.1   5. Hx of colonic polyp  Z86.010 V12.72       Assessment and Plan   Diagnoses and all orders for this visit:    1. Hypothyroidism due to Hashimoto's thyroiditis (Primary)  -     levothyroxine (SYNTHROID, LEVOTHROID) 75 MCG tablet; Take 1 tablet by mouth Every Morning.  Dispense: 90 tablet; Refill: 3  -     CBC & Differential; Future  -     Comprehensive Metabolic Panel; Future  -     TSH+Free T4; Future  -     Lipid Panel; Future  -     PSA Screen; Future    2. Hypogonadism in male  -     CBC & Differential; Future  -     Comprehensive Metabolic Panel; Future  -     TSH+Free T4; Future  -     Lipid Panel; Future  -     PSA Screen; Future    3. Screening PSA (prostate specific antigen)  -     CBC & Differential; Future  -     Comprehensive Metabolic Panel; Future  -     TSH+Free T4; Future  -     Lipid Panel; Future  -     PSA Screen; Future    4. Tobacco use disorder, moderate, in early remission, dependence  -     CBC & Differential; Future  -     Comprehensive Metabolic Panel; Future  -     TSH+Free T4; Future  -     Lipid Panel; Future  -     PSA Screen; Future    5. Hx of colonic polyp  -     CBC & Differential; Future  -     Comprehensive Metabolic Panel; Future  -     TSH+Free T4; Future  -     Lipid Panel; Future  -     PSA Screen; Future    Other orders  -     rosuvastatin (Crestor) 10 MG tablet; Take 1 tablet by mouth Every Night.  Dispense: 90 tablet; Refill: 3    Annual exam April 26, 2024, preventive measures discussed as above,    Class 2 Severe Obesity (BMI >=35 and <=39.9). Obesity-related health conditions include the following: dyslipidemias. Obesity is unchanged. BMI is is above average; BMI management plan is completed. We discussed low calorie, low carb based diet program, portion control, and increasing exercise.    Mixed hyperlipidemia given risk factors would recommend statin therapy with Crestor 10 to 20 mg daily April 26, 2024      Palpitations---    Tobacco abuse, quit 2021 --- CT scan for low-dose screening, done at [facility], April 2024, results show emphysema, no acute findings benign exam, there are a few small nodules less than 4 mm considered benign by criteria, discussed with patient about doing CT yearly for several years just to follow,    Hypothyoridism, cont levothyroxine 75 mcg daily    overwgt, continues weight loss efforts, monitor alcohol use,    tinnitis     Neuropathy fingers// carpal tunnel?,    Colonoscopy feb 2023, micheline --normal , next one 5 yrs     Hypogonadism previous values from October 2021 reviewed----treatment options with AndroGel or Depo testosterone discussed briefly, October 25, 2023,    PSA 0.6 October 18, 2023    Follow Up   Return in about 6 months (around 10/26/2024).  Patient was given instructions and counseling regarding his condition or for health maintenance advice. Please see specific information pulled into the AVS if appropriate.           Answers submitted by the patient for this visit:  Primary Reason for Visit (Submitted on 4/19/2024)  What is the primary reason for your visit?: Other  Other (Submitted on 4/19/2024)  Please describe your symptoms.: i have no symptoms.  this is an annual preventative visit scheduled last year.  Have you had these symptoms before?: No  How long have you been having these symptoms?: Greater than 2 weeks  Please list any medications you are currently taking for this condition.: n/a - i have no symptoms and no issues.  Please describe any probable cause for these symptoms. : n/a - i have no symptoms and no issues.

## 2024-10-29 ENCOUNTER — LAB (OUTPATIENT)
Dept: LAB | Facility: HOSPITAL | Age: 59
End: 2024-10-29
Payer: COMMERCIAL

## 2024-10-29 DIAGNOSIS — Z12.5 SCREENING PSA (PROSTATE SPECIFIC ANTIGEN): ICD-10-CM

## 2024-10-29 DIAGNOSIS — Z86.0100 HX OF COLONIC POLYP: ICD-10-CM

## 2024-10-29 DIAGNOSIS — E06.3 HYPOTHYROIDISM DUE TO HASHIMOTO'S THYROIDITIS: ICD-10-CM

## 2024-10-29 DIAGNOSIS — E29.1 HYPOGONADISM IN MALE: ICD-10-CM

## 2024-10-29 DIAGNOSIS — F17.201 TOBACCO USE DISORDER, MODERATE, IN EARLY REMISSION, DEPENDENCE: ICD-10-CM

## 2024-10-29 LAB
ALBUMIN SERPL-MCNC: 4.5 G/DL (ref 3.5–5.2)
ALBUMIN/GLOB SERPL: 1.6 G/DL
ALP SERPL-CCNC: 57 U/L (ref 39–117)
ALT SERPL W P-5'-P-CCNC: 45 U/L (ref 1–41)
ANION GAP SERPL CALCULATED.3IONS-SCNC: 10 MMOL/L (ref 5–15)
AST SERPL-CCNC: 31 U/L (ref 1–40)
BASOPHILS # BLD AUTO: 0.02 10*3/MM3 (ref 0–0.2)
BASOPHILS NFR BLD AUTO: 0.4 % (ref 0–1.5)
BILIRUB SERPL-MCNC: 0.5 MG/DL (ref 0–1.2)
BUN SERPL-MCNC: 16 MG/DL (ref 6–20)
BUN/CREAT SERPL: 15.5 (ref 7–25)
CALCIUM SPEC-SCNC: 9.3 MG/DL (ref 8.6–10.5)
CHLORIDE SERPL-SCNC: 102 MMOL/L (ref 98–107)
CHOLEST SERPL-MCNC: 177 MG/DL (ref 0–200)
CO2 SERPL-SCNC: 25 MMOL/L (ref 22–29)
CREAT SERPL-MCNC: 1.03 MG/DL (ref 0.76–1.27)
DEPRECATED RDW RBC AUTO: 43.5 FL (ref 37–54)
EGFRCR SERPLBLD CKD-EPI 2021: 84.2 ML/MIN/1.73
EOSINOPHIL # BLD AUTO: 0.11 10*3/MM3 (ref 0–0.4)
EOSINOPHIL NFR BLD AUTO: 1.9 % (ref 0.3–6.2)
ERYTHROCYTE [DISTWIDTH] IN BLOOD BY AUTOMATED COUNT: 13.1 % (ref 12.3–15.4)
GLOBULIN UR ELPH-MCNC: 2.8 GM/DL
GLUCOSE SERPL-MCNC: 102 MG/DL (ref 65–99)
HCT VFR BLD AUTO: 47.6 % (ref 37.5–51)
HDLC SERPL-MCNC: 71 MG/DL (ref 40–60)
HGB BLD-MCNC: 15.5 G/DL (ref 13–17.7)
IMM GRANULOCYTES # BLD AUTO: 0.02 10*3/MM3 (ref 0–0.05)
IMM GRANULOCYTES NFR BLD AUTO: 0.4 % (ref 0–0.5)
LDLC SERPL CALC-MCNC: 82 MG/DL (ref 0–100)
LDLC/HDLC SERPL: 1.09 {RATIO}
LYMPHOCYTES # BLD AUTO: 1.81 10*3/MM3 (ref 0.7–3.1)
LYMPHOCYTES NFR BLD AUTO: 31.7 % (ref 19.6–45.3)
MCH RBC QN AUTO: 29.2 PG (ref 26.6–33)
MCHC RBC AUTO-ENTMCNC: 32.6 G/DL (ref 31.5–35.7)
MCV RBC AUTO: 89.8 FL (ref 79–97)
MONOCYTES # BLD AUTO: 0.61 10*3/MM3 (ref 0.1–0.9)
MONOCYTES NFR BLD AUTO: 10.7 % (ref 5–12)
NEUTROPHILS NFR BLD AUTO: 3.14 10*3/MM3 (ref 1.7–7)
NEUTROPHILS NFR BLD AUTO: 54.9 % (ref 42.7–76)
NRBC BLD AUTO-RTO: 0 /100 WBC (ref 0–0.2)
PLATELET # BLD AUTO: 213 10*3/MM3 (ref 140–450)
PMV BLD AUTO: 9.5 FL (ref 6–12)
POTASSIUM SERPL-SCNC: 4 MMOL/L (ref 3.5–5.2)
PROT SERPL-MCNC: 7.3 G/DL (ref 6–8.5)
PSA SERPL-MCNC: 0.48 NG/ML (ref 0–4)
RBC # BLD AUTO: 5.3 10*6/MM3 (ref 4.14–5.8)
SODIUM SERPL-SCNC: 137 MMOL/L (ref 136–145)
T4 FREE SERPL-MCNC: 1.41 NG/DL (ref 0.92–1.68)
TRIGL SERPL-MCNC: 143 MG/DL (ref 0–150)
TSH SERPL DL<=0.05 MIU/L-ACNC: 4.8 UIU/ML (ref 0.27–4.2)
VLDLC SERPL-MCNC: 24 MG/DL (ref 5–40)
WBC NRBC COR # BLD AUTO: 5.71 10*3/MM3 (ref 3.4–10.8)

## 2024-10-29 PROCEDURE — 36415 COLL VENOUS BLD VENIPUNCTURE: CPT

## 2024-10-29 PROCEDURE — 80061 LIPID PANEL: CPT

## 2024-10-29 PROCEDURE — G0103 PSA SCREENING: HCPCS

## 2024-10-29 PROCEDURE — 84439 ASSAY OF FREE THYROXINE: CPT

## 2024-10-29 PROCEDURE — 80050 GENERAL HEALTH PANEL: CPT

## 2024-11-01 ENCOUNTER — TELEPHONE (OUTPATIENT)
Dept: INTERNAL MEDICINE | Age: 59
End: 2024-11-01

## 2024-11-01 ENCOUNTER — OFFICE VISIT (OUTPATIENT)
Dept: INTERNAL MEDICINE | Age: 59
End: 2024-11-01
Payer: COMMERCIAL

## 2024-11-01 VITALS
OXYGEN SATURATION: 98 % | BODY MASS INDEX: 36.31 KG/M2 | TEMPERATURE: 98.2 F | DIASTOLIC BLOOD PRESSURE: 84 MMHG | WEIGHT: 274 LBS | HEIGHT: 73 IN | HEART RATE: 79 BPM | SYSTOLIC BLOOD PRESSURE: 148 MMHG

## 2024-11-01 DIAGNOSIS — E29.1 HYPOGONADISM IN MALE: ICD-10-CM

## 2024-11-01 DIAGNOSIS — Z12.5 SCREENING PSA (PROSTATE SPECIFIC ANTIGEN): ICD-10-CM

## 2024-11-01 DIAGNOSIS — E06.3 HYPOTHYROIDISM DUE TO HASHIMOTO'S THYROIDITIS: ICD-10-CM

## 2024-11-01 DIAGNOSIS — E78.2 MIXED HYPERLIPIDEMIA: Primary | ICD-10-CM

## 2024-11-01 DIAGNOSIS — F17.201 TOBACCO USE DISORDER, MODERATE, IN EARLY REMISSION, DEPENDENCE: ICD-10-CM

## 2024-11-01 RX ORDER — LEVOTHYROXINE SODIUM 88 UG/1
88 TABLET ORAL DAILY
Qty: 90 TABLET | Refills: 3 | Status: SHIPPED | OUTPATIENT
Start: 2024-11-01

## 2024-11-01 NOTE — PROGRESS NOTES
"CHIEF COMPLAINT/ HPI:  Hypothyroidism (Routine follow up, Lab follow up, would like discuss with provider some issues. )              Objective   Vital Signs  Vitals:    11/01/24 0830   BP: 148/84   BP Location: Right arm   Patient Position: Sitting   Pulse: 79   Temp: 98.2 °F (36.8 °C)   SpO2: 98%   Weight: 124 kg (274 lb)   Height: 185.4 cm (72.99\")      Body mass index is 36.16 kg/m².  Review of Systems   Constitutional: Negative.    HENT: Negative.     Eyes: Negative.    Respiratory: Negative.     Cardiovascular: Negative.    Gastrointestinal: Negative.    Endocrine: Negative.    Genitourinary: Negative.    Musculoskeletal: Negative.    Allergic/Immunologic: Negative.    Neurological: Negative.    Hematological: Negative.    Psychiatric/Behavioral: Negative.        Physical Exam  Constitutional:       General: He is not in acute distress.     Appearance: Normal appearance.   HENT:      Head: Normocephalic.      Mouth/Throat:      Mouth: Mucous membranes are moist.   Eyes:      Conjunctiva/sclera: Conjunctivae normal.      Pupils: Pupils are equal, round, and reactive to light.   Cardiovascular:      Rate and Rhythm: Normal rate and regular rhythm.      Pulses: Normal pulses.      Heart sounds: Normal heart sounds.   Pulmonary:      Effort: Pulmonary effort is normal.      Breath sounds: Normal breath sounds.   Abdominal:      General: Bowel sounds are normal.      Palpations: Abdomen is soft.   Musculoskeletal:         General: No swelling. Normal range of motion.      Cervical back: Neck supple.   Skin:     General: Skin is warm and dry.      Coloration: Skin is not jaundiced.   Neurological:      General: No focal deficit present.      Mental Status: He is alert and oriented to person, place, and time. Mental status is at baseline.   Psychiatric:         Mood and Affect: Mood normal.         Behavior: Behavior normal.         Thought Content: Thought content normal.         Judgment: Judgment normal.      " "  Result Review :   No results found for: \"PROBNP\", \"BNP\"  CMP          4/24/2024    06:58 10/29/2024    07:22   CMP   Glucose 92  102    BUN 18  16    Creatinine 1.02  1.03    EGFR 85.2  84.2    Sodium 140  137    Potassium 4.3  4.0    Chloride 100  102    Calcium 9.7  9.3    Total Protein 7.5  7.3    Albumin 4.6  4.5    Globulin 2.9  2.8    Total Bilirubin 0.5  0.5    Alkaline Phosphatase 56  57    AST (SGOT) 22  31    ALT (SGPT) 24  45    Albumin/Globulin Ratio 1.6  1.6    BUN/Creatinine Ratio 17.6  15.5    Anion Gap 10.9  10.0      CBC w/diff          10/29/2024    07:22   CBC w/Diff   WBC 5.71    RBC 5.30    Hemoglobin 15.5    Hematocrit 47.6    MCV 89.8    MCH 29.2    MCHC 32.6    RDW 13.1    Platelets 213    Neutrophil Rel % 54.9    Immature Granulocyte Rel % 0.4    Lymphocyte Rel % 31.7    Monocyte Rel % 10.7    Eosinophil Rel % 1.9    Basophil Rel % 0.4       Lipid Panel          4/24/2024    06:58 10/29/2024    07:22   Lipid Panel   Total Cholesterol 221  177    Triglycerides 202  143    HDL Cholesterol 61  71    VLDL Cholesterol 35  24    LDL Cholesterol  125  82    LDL/HDL Ratio 1.96  1.09       Lab Results   Component Value Date    TSH 4.800 (H) 10/29/2024    TSH 4.700 (H) 04/24/2024    TSH 2.460 11/04/2022      Lab Results   Component Value Date    FREET4 1.41 10/29/2024    FREET4 1.36 04/24/2024    FREET4 1.57 11/04/2022         PSA          10/29/2024    07:22   PSA   PSA 0.482                     Visit Diagnoses:    ICD-10-CM ICD-9-CM   1. Mixed hyperlipidemia  E78.2 272.2   2. Hypothyroidism due to Hashimoto's thyroiditis  E06.3 245.2   3. Hypogonadism in male  E29.1 257.2   4. Screening PSA (prostate specific antigen)  Z12.5 V76.44   5. Tobacco use disorder, moderate, in early remission, dependence  F17.201 305.1       Assessment and Plan   Diagnoses and all orders for this visit:    1. Mixed hyperlipidemia (Primary)  -     levothyroxine (Synthroid) 88 MCG tablet; Take 1 tablet by mouth Daily.  " Dispense: 90 tablet; Refill: 3  -     Comprehensive Metabolic Panel; Future  -     CBC & Differential; Future  -     Lipid Panel; Future  -     TSH+Free T4; Future    2. Hypothyroidism due to Hashimoto's thyroiditis  -     levothyroxine (Synthroid) 88 MCG tablet; Take 1 tablet by mouth Daily.  Dispense: 90 tablet; Refill: 3  -     Comprehensive Metabolic Panel; Future  -     CBC & Differential; Future  -     Lipid Panel; Future  -     TSH+Free T4; Future    3. Hypogonadism in male  -     levothyroxine (Synthroid) 88 MCG tablet; Take 1 tablet by mouth Daily.  Dispense: 90 tablet; Refill: 3  -     Comprehensive Metabolic Panel; Future  -     CBC & Differential; Future  -     Lipid Panel; Future  -     TSH+Free T4; Future    4. Screening PSA (prostate specific antigen)  -     levothyroxine (Synthroid) 88 MCG tablet; Take 1 tablet by mouth Daily.  Dispense: 90 tablet; Refill: 3  -     Comprehensive Metabolic Panel; Future  -     CBC & Differential; Future  -     Lipid Panel; Future  -     TSH+Free T4; Future    5. Tobacco use disorder, moderate, in early remission, dependence  -     levothyroxine (Synthroid) 88 MCG tablet; Take 1 tablet by mouth Daily.  Dispense: 90 tablet; Refill: 3  -     Comprehensive Metabolic Panel; Future  -     CBC & Differential; Future  -     Lipid Panel; Future  -     TSH+Free T4; Future        Class 2 Severe Obesity (BMI >=35 and <=39.9). Obesity-related health conditions include the following: dyslipidemias. Obesity is worsening. BMI is is above average; BMI management plan is completed. We discussed portion control and increasing exercise.     Annual exam April 26, 2024, preventive measures discussed as above,    Mixed hyperlipidemia continues Crestor 10 mg daily, marked improvement October 29, 2024 continue weight loss efforts, discussion about getting a coronary calcium scan, November 1, 2024 patient will consider    Palpitations---    Elevated ALT, possibly related to medication weight,  alcohol use, cautioned on all and encouraged to lose weight, November 1, 2024    Tobacco abuse, quit 2021 --- CT scan for low-dose screening, done at [facility], April 2024, results show emphysema, no acute findings benign exam, there are a few small nodules less than 4 mm considered benign by criteria, discussed with patient about doing CT yearly for several years just to follow,    Hypothyoridism,, slightly under replaced still October 2024, will increase dose to 88 mcg daily    overwgt, continues weight loss efforts, monitor alcohol use,, November 1, 2024    tinnitis     Neuropathy fingers// carpal tunnel?,    Colonoscopy feb 2023, micheline --normal , next one 5 yrs     Hypogonadism previous values from October 2021 reviewed----treatment options with AndroGel or Depo testosterone discussed briefly, October 25, 2023,    PSA 0.48 October 29th, 2024      Follow Up   Return in about 6 months (around 5/1/2025).  Patient was given instructions and counseling regarding his condition or for health maintenance advice. Please see specific information pulled into the AVS if appropriate.           Answers submitted by the patient for this visit:  Other (Submitted on 10/25/2024)  Please describe your symptoms.: Non. Just checking thyroid bloodwork  Have you had these symptoms before?: Yes  How long have you been having these symptoms?: Greater than 2 weeks  Please list any medications you are currently taking for this condition.: Levothyroxcin  Primary Reason for Visit (Submitted on 10/25/2024)  What is the primary reason for your visit?: Problem Not Listed

## 2024-12-03 ENCOUNTER — TELEPHONE (OUTPATIENT)
Dept: INTERNAL MEDICINE | Age: 59
End: 2024-12-03
Payer: COMMERCIAL

## 2024-12-03 NOTE — TELEPHONE ENCOUNTER
"Pt called and stated that he was told at his last visit that he needed to have his hearing checked and was told to go to \"[facility]\" but cannot find that place anywhere. Do you know what you might have been referring to?  "

## 2025-02-07 ENCOUNTER — TELEPHONE (OUTPATIENT)
Dept: INTERNAL MEDICINE | Age: 60
End: 2025-02-07
Payer: COMMERCIAL

## 2025-02-12 ENCOUNTER — TELEPHONE (OUTPATIENT)
Dept: INTERNAL MEDICINE | Age: 60
End: 2025-02-12

## 2025-02-12 ENCOUNTER — OFFICE VISIT (OUTPATIENT)
Age: 60
End: 2025-02-12
Payer: COMMERCIAL

## 2025-02-12 VITALS
SYSTOLIC BLOOD PRESSURE: 120 MMHG | DIASTOLIC BLOOD PRESSURE: 72 MMHG | OXYGEN SATURATION: 99 % | BODY MASS INDEX: 35.49 KG/M2 | HEART RATE: 85 BPM | HEIGHT: 73 IN | TEMPERATURE: 98.4 F | WEIGHT: 267.8 LBS

## 2025-02-12 DIAGNOSIS — R52 BODY ACHES: Primary | ICD-10-CM

## 2025-02-12 DIAGNOSIS — J40 BRONCHITIS: ICD-10-CM

## 2025-02-12 LAB
EXPIRATION DATE: NORMAL
FLUAV AG UPPER RESP QL IA.RAPID: NOT DETECTED
FLUBV AG UPPER RESP QL IA.RAPID: NOT DETECTED
INTERNAL CONTROL: NORMAL
Lab: NORMAL
SARS-COV-2 AG UPPER RESP QL IA.RAPID: NOT DETECTED

## 2025-02-12 PROCEDURE — 87428 SARSCOV & INF VIR A&B AG IA: CPT | Performed by: NURSE PRACTITIONER

## 2025-02-12 PROCEDURE — 99213 OFFICE O/P EST LOW 20 MIN: CPT | Performed by: NURSE PRACTITIONER

## 2025-02-12 RX ORDER — DOXYCYCLINE 100 MG/1
100 CAPSULE ORAL 2 TIMES DAILY
Qty: 20 CAPSULE | Refills: 0 | Status: SHIPPED | OUTPATIENT
Start: 2025-02-12

## 2025-02-12 RX ORDER — BENZONATATE 200 MG/1
200 CAPSULE ORAL 3 TIMES DAILY PRN
Qty: 30 CAPSULE | Refills: 0 | Status: SHIPPED | OUTPATIENT
Start: 2025-02-12

## 2025-02-12 RX ORDER — PREDNISONE 20 MG/1
20 TABLET ORAL DAILY
Qty: 14 TABLET | Refills: 0 | Status: SHIPPED | OUTPATIENT
Start: 2025-02-12

## 2025-02-12 NOTE — PROGRESS NOTES
"Chief Complaint  acute visit (Started 2 weeks ago, head ache , fever, body ache, cough )    Subjective      Kevin Sampson is a 60 y.o. male who presents to [facility]     History of Present Illness           Objective   Vital Signs:   Vitals:    02/12/25 1138   BP: 120/72   Pulse: 85   Temp: 98.4 °F (36.9 °C)   TempSrc: Oral   SpO2: 99%   Weight: 121 kg (267 lb 12.8 oz)   Height: 185.4 cm (72.99\")     Body mass index is 35.34 kg/m².    Wt Readings from Last 3 Encounters:   02/12/25 121 kg (267 lb 12.8 oz)   11/01/24 124 kg (274 lb)   04/26/24 122 kg (268 lb)     BP Readings from Last 3 Encounters:   02/12/25 120/72   11/01/24 148/84   04/26/24 143/84       Health Maintenance   Topic Date Due   • TDAP/TD VACCINES (1 - Tdap) Never done   • Pneumococcal Vaccine 50+ (1 of 1 - PCV) Never done   • ZOSTER VACCINE (1 of 2) Never done   • LUNG CANCER SCREENING  Never done   • INFLUENZA VACCINE  Never done   • COVID-19 Vaccine (6 - 2024-25 season) 09/01/2024   • ANNUAL PHYSICAL  04/26/2025   • LIPID PANEL  10/29/2025   • BMI FOLLOWUP  11/01/2025   • COLORECTAL CANCER SCREENING  02/07/2028   • HEPATITIS C SCREENING  Completed       Physical Exam     Physical Exam         Result Review :  The following data was reviewed by: AC Sweet on 02/12/2025:    Labs  No visits with results within 2 Month(s) from this visit.   Latest known visit with results is:   Lab on 10/29/2024   Component Date Value Ref Range Status   • Glucose 10/29/2024 102 (H)  65 - 99 mg/dL Final   • BUN 10/29/2024 16  6 - 20 mg/dL Final   • Creatinine 10/29/2024 1.03  0.76 - 1.27 mg/dL Final   • Sodium 10/29/2024 137  136 - 145 mmol/L Final   • Potassium 10/29/2024 4.0  3.5 - 5.2 mmol/L Final   • Chloride 10/29/2024 102  98 - 107 mmol/L Final   • CO2 10/29/2024 25.0  22.0 - 29.0 mmol/L Final   • Calcium 10/29/2024 9.3  8.6 - 10.5 mg/dL Final   • Total Protein 10/29/2024 7.3  6.0 - 8.5 g/dL Final   • Albumin " 10/29/2024 4.5  3.5 - 5.2 g/dL Final   • ALT (SGPT) 10/29/2024 45 (H)  1 - 41 U/L Final   • AST (SGOT) 10/29/2024 31  1 - 40 U/L Final   • Alkaline Phosphatase 10/29/2024 57  39 - 117 U/L Final   • Total Bilirubin 10/29/2024 0.5  0.0 - 1.2 mg/dL Final   • Globulin 10/29/2024 2.8  gm/dL Final   • A/G Ratio 10/29/2024 1.6  g/dL Final   • BUN/Creatinine Ratio 10/29/2024 15.5  7.0 - 25.0 Final   • Anion Gap 10/29/2024 10.0  5.0 - 15.0 mmol/L Final   • eGFR 10/29/2024 84.2  >60.0 mL/min/1.73 Final   • TSH 10/29/2024 4.800 (H)  0.270 - 4.200 uIU/mL Final   • Free T4 10/29/2024 1.41  0.92 - 1.68 ng/dL Final   • Total Cholesterol 10/29/2024 177  0 - 200 mg/dL Final   • Triglycerides 10/29/2024 143  0 - 150 mg/dL Final   • HDL Cholesterol 10/29/2024 71 (H)  40 - 60 mg/dL Final   • LDL Cholesterol  10/29/2024 82  0 - 100 mg/dL Final   • VLDL Cholesterol 10/29/2024 24  5 - 40 mg/dL Final   • LDL/HDL Ratio 10/29/2024 1.09   Final   • PSA 10/29/2024 0.482  0.000 - 4.000 ng/mL Final   • WBC 10/29/2024 5.71  3.40 - 10.80 10*3/mm3 Final   • RBC 10/29/2024 5.30  4.14 - 5.80 10*6/mm3 Final   • Hemoglobin 10/29/2024 15.5  13.0 - 17.7 g/dL Final   • Hematocrit 10/29/2024 47.6  37.5 - 51.0 % Final   • MCV 10/29/2024 89.8  79.0 - 97.0 fL Final   • MCH 10/29/2024 29.2  26.6 - 33.0 pg Final   • MCHC 10/29/2024 32.6  31.5 - 35.7 g/dL Final   • RDW 10/29/2024 13.1  12.3 - 15.4 % Final   • RDW-SD 10/29/2024 43.5  37.0 - 54.0 fl Final   • MPV 10/29/2024 9.5  6.0 - 12.0 fL Final   • Platelets 10/29/2024 213  140 - 450 10*3/mm3 Final   • Neutrophil % 10/29/2024 54.9  42.7 - 76.0 % Final   • Lymphocyte % 10/29/2024 31.7  19.6 - 45.3 % Final   • Monocyte % 10/29/2024 10.7  5.0 - 12.0 % Final   • Eosinophil % 10/29/2024 1.9  0.3 - 6.2 % Final   • Basophil % 10/29/2024 0.4  0.0 - 1.5 % Final   • Immature Grans % 10/29/2024 0.4  0.0 - 0.5 % Final   • Neutrophils, Absolute 10/29/2024 3.14  1.70 - 7.00 10*3/mm3 Final   • Lymphocytes, Absolute  10/29/2024 1.81  0.70 - 3.10 10*3/mm3 Final   • Monocytes, Absolute 10/29/2024 0.61  0.10 - 0.90 10*3/mm3 Final   • Eosinophils, Absolute 10/29/2024 0.11  0.00 - 0.40 10*3/mm3 Final   • Basophils, Absolute 10/29/2024 0.02  0.00 - 0.20 10*3/mm3 Final   • Immature Grans, Absolute 10/29/2024 0.02  0.00 - 0.05 10*3/mm3 Final   • nRBC 10/29/2024 0.0  0.0 - 0.2 /100 WBC Final        Imaging  No Images in the past 120 days found..    Results         Procedures         ASSESSMENT & PLAN  Diagnoses and all orders for this visit:    1. Body aches (Primary)  -     POCT SARS-CoV-2 + Flu Antigen SARY  -     predniSONE (DELTASONE) 20 MG tablet; Take 1 tablet by mouth Daily.  Dispense: 14 tablet; Refill: 0  -     doxycycline (VIBRAMYCIN) 100 MG capsule; Take 1 capsule by mouth 2 (Two) Times a Day.  Dispense: 20 capsule; Refill: 0  -     benzonatate (TESSALON) 200 MG capsule; Take 1 capsule by mouth 3 (Three) Times a Day As Needed for Cough.  Dispense: 30 capsule; Refill: 0    2. Bronchitis  -     POCT SARS-CoV-2 + Flu Antigen SARY  -     predniSONE (DELTASONE) 20 MG tablet; Take 1 tablet by mouth Daily.  Dispense: 14 tablet; Refill: 0  -     doxycycline (VIBRAMYCIN) 100 MG capsule; Take 1 capsule by mouth 2 (Two) Times a Day.  Dispense: 20 capsule; Refill: 0  -     benzonatate (TESSALON) 200 MG capsule; Take 1 capsule by mouth 3 (Three) Times a Day As Needed for Cough.  Dispense: 30 capsule; Refill: 0         Assessment & Plan         {Class 2 Severe Obesity (BMI >=35 and <=39.9. (Optional):50630}       {Time Spent (Optional):42304}    FOLLOW UP  No follow-ups on file.  Patient was given instructions and counseling regarding his condition or for health maintenance advice. Please see specific information pulled into the AVS if appropriate.     {PRANAY [employer] Provider Statement:[id number]}    Vanita Botello, APRN  02/12/25  12:07 EST

## 2025-02-12 NOTE — PROGRESS NOTES
"Chief Complaint  acute visit (Started 2 weeks ago, head ache , fever, body ache, cough )    Subjective      Kevin Sampson is a 60 y.o. male who presents to [facility]     History of Present Illness  The patient presents for same-day appointment with congestion cough fatigue and occasional low-grade fever.    He reports experiencing fatigue, intermittent headaches, and a persistent crackling sound during exhalation. He also describes episodes of temperature fluctuations, characterized by periods of feeling hot, sweating, and then feeling cold. These symptoms have been present for approximately 2 weeks. He sought medical advice via telemedicine, where he was informed that his symptoms were indicative of influenza and was advised to allow the illness to run its course. He has no history of asthma or chronic respiratory issues however he is an ex-smoker,  having quit 5 years ago. 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  He has been using an inhaler and benzonatate, both of which have provided some relief from the cough.  He takes ibuprofen for his headaches but has not taken any medication for his fever. He has been prescribed Tessalon Perles for his cough, which has been minimally productive.  He denies any shortness of air.  He just feels like this illness has lingered and the cough is no better.    SOCIAL HISTORY  He quit smoking 5 years ago. He is employed as a .    MEDICATIONS  Current: ibuprofen, benzonatate, inhaler, thyroid pill, Crestor         Objective   Vital Signs:   Vitals:    02/12/25 1138   BP: 120/72   Pulse: 85   Temp: 98.4 °F (36.9 °C)   TempSrc: Oral   SpO2: 99%   Weight: 121 kg (267 lb 12.8 oz)   Height: 185.4 cm (72.99\")     Body mass index is 35.34 kg/m².    Wt Readings from Last 3 Encounters:   02/12/25 121 kg (267 lb 12.8 oz)   11/01/24 124 kg (274 lb)   04/26/24 122 kg (268 lb) " "    BP Readings from Last 3 Encounters:   25 120/72   24 148/84   24 143/84       Health Maintenance   Topic Date Due    TDAP/TD VACCINES (1 - Tdap) Never done    Pneumococcal Vaccine 50+ (1 of 1 - PCV) Never done    ZOSTER VACCINE (1 of 2) Never done    LUNG CANCER SCREENING  Never done    INFLUENZA VACCINE  Never done    COVID-19 Vaccine ( season) 2024    ANNUAL PHYSICAL  2025    LIPID PANEL  10/29/2025    BMI FOLLOWUP  2025    COLORECTAL CANCER SCREENING  2028    HEPATITIS C SCREENING  Completed       Past Medical History:   Diagnosis Date    Allergic 2000    Colon polyp     first visit to     Disease of thyroid gland     Hernia 2000    repaired     Mixed hyperlipidemia 2024    Pneumonia 1982    Seasonal allergies      Past Surgical History:   Procedure Laterality Date    BACK SURGERY      \" a couple of back surgeries in the  \"    COLONOSCOPY          COLONOSCOPY N/A 2023    Procedure: COLONOSCOPY WITH POLYPECTOMIES;  Surgeon: Yasir Sunshine MD;  Location: [facility] ENDOSCOPY;  Service: Gastroenterology;  Laterality: N/A;  COLON POLYPS    HERNIA REPAIR      SPINE SURGERY  [id number]    WISDOM TOOTH EXTRACTION       Family History   Problem Relation Age of Onset    Crohn's disease Maternal Uncle     Colon cancer Paternal Grandmother     Crohn's disease Paternal Grandmother     Cancer Paternal Grandmother     Arthritis Mother     Cancer Father     Diabetes Father         type2    Malig Hyperthermia Neg Hx      Social History     Socioeconomic History    Marital status:    Tobacco Use    Smoking status: Former     Current packs/day: 0.00     Average packs/day: 0.5 packs/day for 41.7 years (20.9 ttl pk-yrs)     Types: Cigarettes     Start date: 10/1/1980     Quit date: 2022     Years since quittin.6    Smokeless tobacco: Former     Types: Chew    Tobacco comments:     snuff/chewing tobacco , " 1990's.  cigar occasional   Vaping Use    Vaping status: Never Used   Substance and Sexual Activity    Alcohol use: Yes     Alcohol/week: 10.0 standard drinks of alcohol     Types: 2 Glasses of wine, 6 Cans of beer, 2 Drinks containing 0.5 oz of alcohol per week     Comment: WEEKLY    Drug use: Never    Sexual activity: Yes     Partners: Female       Current Outpatient Medications   Medication Instructions    aspirin 81 MG chewable tablet aspirin 81 mg oral tablet,chewable chew 1 tablet (81 mg) by oral route once daily   Active    benzonatate (TESSALON) 200 mg, Oral, 3 Times Daily PRN    cetirizine (zyrTEC) 10 MG tablet cetirizine 10 mg oral tablet take 1 tablet (10 mg) by oral route once daily   Suspended    doxycycline (VIBRAMYCIN) 100 mg, Oral, 2 Times Daily    levothyroxine (SYNTHROID) 88 mcg, Oral, Daily    levothyroxine (SYNTHROID, LEVOTHROID) 75 mcg, Oral, Every Early Morning    Magnesium 500 MG tablet 1 each, Daily    predniSONE (DELTASONE) 20 mg, Oral, Daily    rosuvastatin (CRESTOR) 10 mg, Oral, Nightly    vitamin B-12 (CYANOCOBALAMIN) 100 MCG tablet Vitamin B12    vitamin D (ERGOCALCIFEROL) 50,000 Units, Daily       There are no discontinued medications.     Physical Exam  Vitals and nursing note reviewed.   Constitutional:       Appearance: Normal appearance.   HENT:      Head: Normocephalic.   Eyes:      Extraocular Movements: Extraocular movements intact.      Pupils: Pupils are equal, round, and reactive to light.   Cardiovascular:      Rate and Rhythm: Normal rate and regular rhythm.      Pulses: Normal pulses.   Pulmonary:      Effort: Pulmonary effort is normal.      Comments: Bilateral inspiratory wheezing  Abdominal:      Palpations: Abdomen is soft.   Musculoskeletal:         General: Normal range of motion.      Cervical back: Normal range of motion.   Skin:     General: Skin is warm and dry.   Neurological:      General: No focal deficit present.      Mental Status: He is alert.   Psychiatric:          Mood and Affect: Mood normal.         Behavior: Behavior normal.         Thought Content: Thought content normal.          Physical Exam  Ears are congested. Throat was examined.  Wheezing noted in the lungs.    Vital Signs  Oxygen level is normal.       Result Review :  The following data was reviewed by: AC Swete on 02/12/2025:    Labs  No visits with results within 2 Month(s) from this visit.   Latest known visit with results is:   Lab on 10/29/2024   Component Date Value Ref Range Status    Glucose 10/29/2024 102 (H)  65 - 99 mg/dL Final    BUN 10/29/2024 16  6 - 20 mg/dL Final    Creatinine 10/29/2024 1.03  0.76 - 1.27 mg/dL Final    Sodium 10/29/2024 137  136 - 145 mmol/L Final    Potassium 10/29/2024 4.0  3.5 - 5.2 mmol/L Final    Chloride 10/29/2024 102  98 - 107 mmol/L Final    CO2 10/29/2024 25.0  22.0 - 29.0 mmol/L Final    Calcium 10/29/2024 9.3  8.6 - 10.5 mg/dL Final    Total Protein 10/29/2024 7.3  6.0 - 8.5 g/dL Final    Albumin 10/29/2024 4.5  3.5 - 5.2 g/dL Final    ALT (SGPT) 10/29/2024 45 (H)  1 - 41 U/L Final    AST (SGOT) 10/29/2024 31  1 - 40 U/L Final    Alkaline Phosphatase 10/29/2024 57  39 - 117 U/L Final    Total Bilirubin 10/29/2024 0.5  0.0 - 1.2 mg/dL Final    Globulin 10/29/2024 2.8  gm/dL Final    A/G Ratio 10/29/2024 1.6  g/dL Final    BUN/Creatinine Ratio 10/29/2024 15.5  7.0 - 25.0 Final    Anion Gap 10/29/2024 10.0  5.0 - 15.0 mmol/L Final    eGFR 10/29/2024 84.2  >60.0 mL/min/1.73 Final    TSH 10/29/2024 4.800 (H)  0.270 - 4.200 uIU/mL Final    Free T4 10/29/2024 1.41  0.92 - 1.68 ng/dL Final    Total Cholesterol 10/29/2024 177  0 - 200 mg/dL Final    Triglycerides 10/29/2024 143  0 - 150 mg/dL Final    HDL Cholesterol 10/29/2024 71 (H)  40 - 60 mg/dL Final    LDL Cholesterol  10/29/2024 82  0 - 100 mg/dL Final    VLDL Cholesterol 10/29/2024 24  5 - 40 mg/dL Final    LDL/HDL Ratio 10/29/2024 1.09   Final    PSA 10/29/2024 0.482  0.000 - 4.000 ng/mL  Final    WBC 10/29/2024 5.71  3.40 - 10.80 10*3/mm3 Final    RBC 10/29/2024 5.30  4.14 - 5.80 10*6/mm3 Final    Hemoglobin 10/29/2024 15.5  13.0 - 17.7 g/dL Final    Hematocrit 10/29/2024 47.6  37.5 - 51.0 % Final    MCV 10/29/2024 89.8  79.0 - 97.0 fL Final    MCH 10/29/2024 29.2  26.6 - 33.0 pg Final    MCHC 10/29/2024 32.6  31.5 - 35.7 g/dL Final    RDW 10/29/2024 13.1  12.3 - 15.4 % Final    RDW-SD 10/29/2024 43.5  37.0 - 54.0 fl Final    MPV 10/29/2024 9.5  6.0 - 12.0 fL Final    Platelets 10/29/2024 213  140 - 450 10*3/mm3 Final    Neutrophil % 10/29/2024 54.9  42.7 - 76.0 % Final    Lymphocyte % 10/29/2024 31.7  19.6 - 45.3 % Final    Monocyte % 10/29/2024 10.7  5.0 - 12.0 % Final    Eosinophil % 10/29/2024 1.9  0.3 - 6.2 % Final    Basophil % 10/29/2024 0.4  0.0 - 1.5 % Final    Immature Grans % 10/29/2024 0.4  0.0 - 0.5 % Final    Neutrophils, Absolute 10/29/2024 3.14  1.70 - 7.00 10*3/mm3 Final    Lymphocytes, Absolute 10/29/2024 1.81  0.70 - 3.10 10*3/mm3 Final    Monocytes, Absolute 10/29/2024 0.61  0.10 - 0.90 10*3/mm3 Final    Eosinophils, Absolute 10/29/2024 0.11  0.00 - 0.40 10*3/mm3 Final    Basophils, Absolute 10/29/2024 0.02  0.00 - 0.20 10*3/mm3 Final    Immature Grans, Absolute 10/29/2024 0.02  0.00 - 0.05 10*3/mm3 Final    nRBC 10/29/2024 0.0  0.0 - 0.2 /100 WBC Final       Imaging  No Images in the past 120 days found..    Results  Laboratory Studies  COVID-19 and influenza tests were negative.       Procedures       ASSESSMENT & PLAN  Diagnoses and all orders for this visit:    1. Body aches (Primary)  -     POCT SARS-CoV-2 + Flu Antigen SARY  -     predniSONE (DELTASONE) 20 MG tablet; Take 1 tablet by mouth Daily.  Dispense: 14 tablet; Refill: 0  -     doxycycline (VIBRAMYCIN) 100 MG capsule; Take 1 capsule by mouth 2 (Two) Times a Day.  Dispense: 20 capsule; Refill: 0  -     benzonatate (TESSALON) 200 MG capsule; Take 1 capsule by mouth 3 (Three) Times a Day As Needed for Cough.  Dispense:  30 capsule; Refill: 0    2. Bronchitis  -     POCT SARS-CoV-2 + Flu Antigen SARY  -     predniSONE (DELTASONE) 20 MG tablet; Take 1 tablet by mouth Daily.  Dispense: 14 tablet; Refill: 0  -     doxycycline (VIBRAMYCIN) 100 MG capsule; Take 1 capsule by mouth 2 (Two) Times a Day.  Dispense: 20 capsule; Refill: 0  -     benzonatate (TESSALON) 200 MG capsule; Take 1 capsule by mouth 3 (Three) Times a Day As Needed for Cough.  Dispense: 30 capsule; Refill: 0         Assessment & Plan  1. Bronchitis.  Symptoms suggest a post flu secondary infection, potentially bronchitis.  Wheezing indicates bronchitis. Oxygen saturation levels are within normal range. COVID-19 and influenza tests were negative today in the office.  A prescription for steroids twice daily will be provided, with instructions to reduce the dosage to once daily if side effects such as jitteriness, irritability, or sleep disturbances occur. Doxycycline will be prescribed to be taken twice daily with a full glass of water. Refill for Tessalon Perles will be provided. He is advised to maintain adequate rest, hydration, and nutrition.  Increase rest.  He should replace his toothbrush.  If symptoms worsen or persist he needs to be seen                  FOLLOW UP  No follow-ups on file.  Patient was given instructions and counseling regarding his condition or for health maintenance advice. Please see specific information pulled into the AVS if appropriate.     Patient or patient representative verbalized consent for the use of Ambient Listening during the visit with  AC Sweet for chart documentation. 2/12/2025  12:34 EST    AC Sweet  02/12/25  12:34 EST

## 2025-02-12 NOTE — TELEPHONE ENCOUNTER
"    Caller: Kevin Sampson \"Cordell\"    Relationship to patient: Self    Best call back number: [phone]    Chief complaint: FLU LIKE SYMPTOMS     Type of visit:     Requested date: TODAY OR TOMORROW       Additional notes:PATIENT WAS TOLD THERE WAS NOTHING AVAILABLE BUT WANTED TO SEE IF HE COULD BE PUT ON WAIT LIST JUST IN CASE SOMETHING CAME OPEN.           "

## 2025-02-18 ENCOUNTER — OFFICE VISIT (OUTPATIENT)
Dept: INTERNAL MEDICINE | Age: 60
End: 2025-02-18
Payer: COMMERCIAL

## 2025-02-18 VITALS
BODY MASS INDEX: 35.39 KG/M2 | HEIGHT: 73 IN | HEART RATE: 82 BPM | DIASTOLIC BLOOD PRESSURE: 82 MMHG | OXYGEN SATURATION: 95 % | SYSTOLIC BLOOD PRESSURE: 144 MMHG | TEMPERATURE: 98.4 F | WEIGHT: 267 LBS

## 2025-02-18 DIAGNOSIS — E66.09 CLASS 2 OBESITY DUE TO EXCESS CALORIES WITH BODY MASS INDEX (BMI) OF 35.0 TO 35.9 IN ADULT, UNSPECIFIED WHETHER SERIOUS COMORBIDITY PRESENT: Primary | ICD-10-CM

## 2025-02-18 DIAGNOSIS — E66.812 CLASS 2 OBESITY DUE TO EXCESS CALORIES WITH BODY MASS INDEX (BMI) OF 35.0 TO 35.9 IN ADULT, UNSPECIFIED WHETHER SERIOUS COMORBIDITY PRESENT: Primary | ICD-10-CM

## 2025-02-18 PROCEDURE — 99213 OFFICE O/P EST LOW 20 MIN: CPT | Performed by: INTERNAL MEDICINE

## 2025-02-18 NOTE — PROGRESS NOTES
"Chief Complaint   Patient presents with    Obesity     Weight loss consult, Pt is covered on [facility] weight loss plan, Wegovy, Saxenda and Zepbound are covered and will need a PA.   Patient is currently going through [facility] doing some weight loss management through program, counseling, he is ready to try some GLP-1's,    Objective   Vital Signs  Vitals:    02/18/25 1053   BP: 144/82   BP Location: Right arm   Patient Position: Sitting   Pulse: 82   Temp: 98.4 °F (36.9 °C)   SpO2: 95%   Weight: 121 kg (267 lb)   Height: 185.4 cm (72.99\")      Body mass index is 35.23 kg/m².  Review of Systems   Constitutional: Negative.    HENT: Negative.     Eyes: Negative.    Respiratory: Negative.     Cardiovascular: Negative.    Gastrointestinal: Negative.    Endocrine: Negative.    Genitourinary: Negative.    Musculoskeletal: Negative.    Allergic/Immunologic: Negative.    Neurological: Negative.    Hematological: Negative.    Psychiatric/Behavioral: Negative.        Physical Exam  Constitutional:       General: He is not in acute distress.     Appearance: Normal appearance.   HENT:      Head: Normocephalic.      Mouth/Throat:      Mouth: Mucous membranes are moist.   Eyes:      Conjunctiva/sclera: Conjunctivae normal.      Pupils: Pupils are equal, round, and reactive to light.   Cardiovascular:      Rate and Rhythm: Normal rate and regular rhythm.      Pulses: Normal pulses.      Heart sounds: Normal heart sounds.   Pulmonary:      Effort: Pulmonary effort is normal.      Breath sounds: Normal breath sounds.   Abdominal:      General: Bowel sounds are normal.      Palpations: Abdomen is soft.   Musculoskeletal:         General: No swelling. Normal range of motion.      Cervical back: Neck supple.   Skin:     General: Skin is warm and dry.      Coloration: Skin is not jaundiced.   Neurological:      General: No focal deficit present.      Mental Status: He is alert and oriented to person, place, and time. Mental status is at baseline. " "  Psychiatric:         Mood and Affect: Mood normal.         Behavior: Behavior normal.         Thought Content: Thought content normal.         Judgment: Judgment normal.        Result Review :   No results found for: \"PROBNP\", \"BNP\"  CMP          4/24/2024    06:58 10/29/2024    07:22   CMP   Glucose 92  102    BUN 18  16    Creatinine 1.02  1.03    EGFR 85.2  84.2    Sodium 140  137    Potassium 4.3  4.0    Chloride 100  102    Calcium 9.7  9.3    Total Protein 7.5  7.3    Albumin 4.6  4.5    Globulin 2.9  2.8    Total Bilirubin 0.5  0.5    Alkaline Phosphatase 56  57    AST (SGOT) 22  31    ALT (SGPT) 24  45    Albumin/Globulin Ratio 1.6  1.6    BUN/Creatinine Ratio 17.6  15.5    Anion Gap 10.9  10.0      CBC w/diff          10/29/2024    07:22   CBC w/Diff   WBC 5.71    RBC 5.30    Hemoglobin 15.5    Hematocrit 47.6    MCV 89.8    MCH 29.2    MCHC 32.6    RDW 13.1    Platelets 213    Neutrophil Rel % 54.9    Immature Granulocyte Rel % 0.4    Lymphocyte Rel % 31.7    Monocyte Rel % 10.7    Eosinophil Rel % 1.9    Basophil Rel % 0.4       Lipid Panel          4/24/2024    06:58 10/29/2024    07:22   Lipid Panel   Total Cholesterol 221  177    Triglycerides 202  143    HDL Cholesterol 61  71    VLDL Cholesterol 35  24    LDL Cholesterol  125  82    LDL/HDL Ratio 1.96  1.09       Lab Results   Component Value Date    TSH 4.800 (H) 10/29/2024    TSH 4.700 (H) 04/24/2024    TSH 2.460 11/04/2022      Lab Results   Component Value Date    FREET4 1.41 10/29/2024    FREET4 1.36 04/24/2024    FREET4 1.57 11/04/2022         PSA          10/29/2024    07:22   PSA   PSA 0.482                     Visit Diagnoses:    ICD-10-CM ICD-9-CM   1. Class 2 obesity due to excess calories with body mass index (BMI) of 35.0 to 35.9 in adult, unspecified whether serious comorbidity present  E66.812 278.00    Z68.35 V85.35    E66.09        Assessment and Plan   Diagnoses and all orders for this visit:    1. Class 2 obesity due to excess " calories with body mass index (BMI) of 35.0 to 35.9 in adult, unspecified whether serious comorbidity present (Primary)    Other orders  -     Tirzepatide-Weight Management (ZEPBOUND) 2.5 MG/0.5ML solution auto-injector; Inject 0.5 mL under the skin into the appropriate area as directed 1 (One) Time Per Week.  Dispense: 2 mL; Refill: 2        Class 2 Severe Obesity (BMI >=35 and <=39.9). Obesity-related health conditions include the following: dyslipidemias. Obesity is worsening. BMI is is above average; BMI management plan is completed. We discussed portion control and increasing exercise.  Zepbound 2.5 mg sent in February 18, 2025, patient will monitor weight, calorie intake continue trying to walk every day if possible     Annual exam April 26, 2024,     Mixed hyperlipidemia continues Crestor 10 mg daily, marked improvement October 29, 2024 continue weight loss efforts, discussion about getting a coronary calcium scan, November 1, 2024 patient will consider    Palpitations---    Elevated ALT, possibly related to medication weight, alcohol use, cautioned on all and encouraged to lose weight, November 1, 2024    Tobacco abuse, quit 2021 --- CT scan for low-dose screening, done at [facility], April 2024, results show emphysema, no acute findings benign exam, there are a few small nodules less than 4 mm considered benign by criteria, discussed with patient about doing CT yearly for several years just to follow,    Hypothyoridism,, slightly under replaced still October 2024, will increase dose to 88 mcg daily    tinnitis     Neuropathy fingers// carpal tunnel?,    Colonoscopy feb 2023micheline --normal , next one 5 yrs     Hypogonadism previous values from October 2021 reviewed----treatment options with AndroGel or Depo testosterone discussed briefly, October 25, 2023,    PSA 0.48 October 29th, 2024               Follow Up   No follow-ups on file.  Patient was given instructions and counseling regarding his  condition or for health maintenance advice. Please see specific information pulled into the AVS if appropriate.

## 2025-04-07 ENCOUNTER — TELEPHONE (OUTPATIENT)
Dept: INTERNAL MEDICINE | Age: 60
End: 2025-04-07
Payer: COMMERCIAL

## 2025-04-07 DIAGNOSIS — E66.812 CLASS 2 OBESITY DUE TO EXCESS CALORIES WITH BODY MASS INDEX (BMI) OF 35.0 TO 35.9 IN ADULT, UNSPECIFIED WHETHER SERIOUS COMORBIDITY PRESENT: Primary | ICD-10-CM

## 2025-04-07 DIAGNOSIS — E66.09 CLASS 2 OBESITY DUE TO EXCESS CALORIES WITH BODY MASS INDEX (BMI) OF 35.0 TO 35.9 IN ADULT, UNSPECIFIED WHETHER SERIOUS COMORBIDITY PRESENT: Primary | ICD-10-CM

## 2025-04-07 NOTE — TELEPHONE ENCOUNTER
"  Caller: Kevin Sampson \"Cordell\"    Relationship: Self    Best call back number: [phone]     What medication are you requesting: ZEPBOUND 5 MG    If a prescription is needed, what is your preferred pharmacy and phone number: [facility]/PHARMACY #[id number] - [place], [place] - [address] - [phone]  - [phone] FX     Additional notes:CALLER STATED THAT THERE WERE NO ISSUES WITH THE FIRST MONTH OF ZEPBOUND AT 2.5 MG AND WAS INFORMED TO REQUEST 5 MG DOSE FOR THE SECOND MONTH.   CALLER ALSO STATED THAT HE LOST 4 POUNDS WITHIN THE PAST MONTH.   "

## 2025-05-09 DIAGNOSIS — E66.812 CLASS 2 OBESITY DUE TO EXCESS CALORIES WITH BODY MASS INDEX (BMI) OF 35.0 TO 35.9 IN ADULT, UNSPECIFIED WHETHER SERIOUS COMORBIDITY PRESENT: Primary | ICD-10-CM

## 2025-05-09 DIAGNOSIS — E66.09 CLASS 2 OBESITY DUE TO EXCESS CALORIES WITH BODY MASS INDEX (BMI) OF 35.0 TO 35.9 IN ADULT, UNSPECIFIED WHETHER SERIOUS COMORBIDITY PRESENT: Primary | ICD-10-CM

## 2025-05-09 NOTE — TELEPHONE ENCOUNTER
"Caller: Kevin Sampson \"Cordell\"    Relationship: Self    Best call back number: [phone]     Requested Prescriptions:   Requested Prescriptions     Pending Prescriptions Disp Refills    Tirzepatide-Weight Management (ZEPBOUND) 2.5 MG/0.5ML solution auto-injector 2 mL 2     Sig: Inject 0.5 mL under the skin into the appropriate area as directed 1 (One) Time Per Week.        Pharmacy where request should be sent: [facility]/PHARMACY #[id number] - [place], [place] - [address] [facility] [phone] [facility] [phone] FX     Last office visit with prescribing clinician: 2/18/2025   Last telemedicine visit with prescribing clinician: Visit date not found   Next office visit with prescribing clinician: 5/19/2025     Additional details provided by patient: PATIENT NEEDS TO INCREASE TO 7.5 FRO THIS REFILL    Does the patient have less than a 3 day supply:  [x] Yes  [] No    Would you like a call back once the refill request has been completed: [] Yes [] No    If the office needs to give you a call back, can they leave a voicemail: [] Yes [] No    Dave Fish Rep   05/09/25 12:29 EDT   "

## 2025-05-09 NOTE — TELEPHONE ENCOUNTER
Pt requested next dose up of Zepbound 7.5 MG.   States he took his last dose of 5 MG this past Monday, next dose is due on Monday 5/12/25.    Advised pt to have labs done at earliest convenience.    Ok to send in? Unsure on quantities to fill.   Pended #4 mL / 1 refill if you are agreeable.    Advised pt of possible 72 hr response time, voiced understanding.

## 2025-05-14 ENCOUNTER — LAB (OUTPATIENT)
Dept: LAB | Facility: HOSPITAL | Age: 60
End: 2025-05-14
Payer: COMMERCIAL

## 2025-05-14 DIAGNOSIS — E29.1 HYPOGONADISM IN MALE: ICD-10-CM

## 2025-05-14 DIAGNOSIS — Z12.5 SCREENING PSA (PROSTATE SPECIFIC ANTIGEN): ICD-10-CM

## 2025-05-14 DIAGNOSIS — F17.201 TOBACCO USE DISORDER, MODERATE, IN EARLY REMISSION, DEPENDENCE: ICD-10-CM

## 2025-05-14 DIAGNOSIS — E78.2 MIXED HYPERLIPIDEMIA: ICD-10-CM

## 2025-05-14 DIAGNOSIS — E06.3 HYPOTHYROIDISM DUE TO HASHIMOTO'S THYROIDITIS: ICD-10-CM

## 2025-05-14 LAB
ALBUMIN SERPL-MCNC: 4.3 G/DL (ref 3.5–5.2)
ALBUMIN/GLOB SERPL: 1.4 G/DL
ALP SERPL-CCNC: 54 U/L (ref 39–117)
ALT SERPL W P-5'-P-CCNC: 19 U/L (ref 1–41)
ANION GAP SERPL CALCULATED.3IONS-SCNC: 13 MMOL/L (ref 5–15)
AST SERPL-CCNC: 19 U/L (ref 1–40)
BASOPHILS # BLD AUTO: 0.04 10*3/MM3 (ref 0–0.2)
BASOPHILS NFR BLD AUTO: 0.8 % (ref 0–1.5)
BILIRUB SERPL-MCNC: 0.4 MG/DL (ref 0–1.2)
BUN SERPL-MCNC: 13 MG/DL (ref 6–20)
BUN/CREAT SERPL: 14.9 (ref 7–25)
CALCIUM SPEC-SCNC: 9.4 MG/DL (ref 8.6–10.5)
CHLORIDE SERPL-SCNC: 101 MMOL/L (ref 98–107)
CHOLEST SERPL-MCNC: 119 MG/DL (ref 0–200)
CO2 SERPL-SCNC: 25 MMOL/L (ref 22–29)
CREAT SERPL-MCNC: 0.87 MG/DL (ref 0.76–1.27)
DEPRECATED RDW RBC AUTO: 44.1 FL (ref 37–54)
EGFRCR SERPLBLD CKD-EPI 2021: 99.4 ML/MIN/1.73
EOSINOPHIL # BLD AUTO: 0.16 10*3/MM3 (ref 0–0.4)
EOSINOPHIL NFR BLD AUTO: 3 % (ref 0.3–6.2)
ERYTHROCYTE [DISTWIDTH] IN BLOOD BY AUTOMATED COUNT: 13.6 % (ref 12.3–15.4)
GLOBULIN UR ELPH-MCNC: 3 GM/DL
GLUCOSE SERPL-MCNC: 84 MG/DL (ref 65–99)
HCT VFR BLD AUTO: 46.5 % (ref 37.5–51)
HDLC SERPL-MCNC: 57 MG/DL (ref 40–60)
HGB BLD-MCNC: 15.4 G/DL (ref 13–17.7)
IMM GRANULOCYTES # BLD AUTO: 0.02 10*3/MM3 (ref 0–0.05)
IMM GRANULOCYTES NFR BLD AUTO: 0.4 % (ref 0–0.5)
LDLC SERPL CALC-MCNC: 46 MG/DL (ref 0–100)
LDLC/HDLC SERPL: 0.81 {RATIO}
LYMPHOCYTES # BLD AUTO: 1.58 10*3/MM3 (ref 0.7–3.1)
LYMPHOCYTES NFR BLD AUTO: 29.9 % (ref 19.6–45.3)
MCH RBC QN AUTO: 29.7 PG (ref 26.6–33)
MCHC RBC AUTO-ENTMCNC: 33.1 G/DL (ref 31.5–35.7)
MCV RBC AUTO: 89.8 FL (ref 79–97)
MONOCYTES # BLD AUTO: 0.59 10*3/MM3 (ref 0.1–0.9)
MONOCYTES NFR BLD AUTO: 11.2 % (ref 5–12)
NEUTROPHILS NFR BLD AUTO: 2.9 10*3/MM3 (ref 1.7–7)
NEUTROPHILS NFR BLD AUTO: 54.7 % (ref 42.7–76)
NRBC BLD AUTO-RTO: 0 /100 WBC (ref 0–0.2)
PLATELET # BLD AUTO: 207 10*3/MM3 (ref 140–450)
PMV BLD AUTO: 10.1 FL (ref 6–12)
POTASSIUM SERPL-SCNC: 4.1 MMOL/L (ref 3.5–5.2)
PROT SERPL-MCNC: 7.3 G/DL (ref 6–8.5)
RBC # BLD AUTO: 5.18 10*6/MM3 (ref 4.14–5.8)
SODIUM SERPL-SCNC: 139 MMOL/L (ref 136–145)
T4 FREE SERPL-MCNC: 1.65 NG/DL (ref 0.92–1.68)
TRIGL SERPL-MCNC: 78 MG/DL (ref 0–150)
TSH SERPL DL<=0.05 MIU/L-ACNC: 1.93 UIU/ML (ref 0.27–4.2)
VLDLC SERPL-MCNC: 16 MG/DL (ref 5–40)
WBC NRBC COR # BLD AUTO: 5.29 10*3/MM3 (ref 3.4–10.8)

## 2025-05-14 PROCEDURE — 80061 LIPID PANEL: CPT

## 2025-05-14 PROCEDURE — 80050 GENERAL HEALTH PANEL: CPT

## 2025-05-14 PROCEDURE — 36415 COLL VENOUS BLD VENIPUNCTURE: CPT

## 2025-05-14 PROCEDURE — 84439 ASSAY OF FREE THYROXINE: CPT

## 2025-05-19 ENCOUNTER — OFFICE VISIT (OUTPATIENT)
Dept: INTERNAL MEDICINE | Age: 60
End: 2025-05-19
Payer: COMMERCIAL

## 2025-05-19 VITALS
TEMPERATURE: 98 F | WEIGHT: 251 LBS | HEIGHT: 73 IN | DIASTOLIC BLOOD PRESSURE: 84 MMHG | BODY MASS INDEX: 33.27 KG/M2 | OXYGEN SATURATION: 95 % | HEART RATE: 81 BPM | SYSTOLIC BLOOD PRESSURE: 136 MMHG

## 2025-05-19 DIAGNOSIS — Z00.00 PHYSICAL EXAM, ANNUAL: Primary | ICD-10-CM

## 2025-05-19 DIAGNOSIS — E29.1 HYPOGONADISM IN MALE: ICD-10-CM

## 2025-05-19 DIAGNOSIS — Z12.5 SCREENING PSA (PROSTATE SPECIFIC ANTIGEN): ICD-10-CM

## 2025-05-19 DIAGNOSIS — E78.2 MIXED HYPERLIPIDEMIA: ICD-10-CM

## 2025-05-19 DIAGNOSIS — E66.01 CLASS 2 SEVERE OBESITY DUE TO EXCESS CALORIES WITH SERIOUS COMORBIDITY AND BODY MASS INDEX (BMI) OF 35.0 TO 35.9 IN ADULT: ICD-10-CM

## 2025-05-19 DIAGNOSIS — Z86.0100 HX OF COLONIC POLYP: ICD-10-CM

## 2025-05-19 DIAGNOSIS — F17.201 TOBACCO USE DISORDER, MODERATE, IN EARLY REMISSION, DEPENDENCE: ICD-10-CM

## 2025-05-19 DIAGNOSIS — E66.812 CLASS 2 SEVERE OBESITY DUE TO EXCESS CALORIES WITH SERIOUS COMORBIDITY AND BODY MASS INDEX (BMI) OF 35.0 TO 35.9 IN ADULT: ICD-10-CM

## 2025-05-19 DIAGNOSIS — E06.3 HYPOTHYROIDISM DUE TO HASHIMOTO'S THYROIDITIS: ICD-10-CM

## 2025-05-19 PROCEDURE — 99396 PREV VISIT EST AGE 40-64: CPT | Performed by: INTERNAL MEDICINE

## 2025-05-19 RX ORDER — ROSUVASTATIN CALCIUM 10 MG/1
10 TABLET, COATED ORAL NIGHTLY
Qty: 90 TABLET | Refills: 2 | Status: SHIPPED | OUTPATIENT
Start: 2025-05-19

## 2025-05-19 RX ORDER — UBIDECARENONE 100 MG
100 CAPSULE ORAL DAILY
COMMUNITY

## 2025-05-19 NOTE — PROGRESS NOTES
"Chief Complaint   Patient presents with    Annual Exam     Physical, Lab follow up. As per insurance Zepbound needs to be switched to Wegovy.    Patient is lost close to 30 pounds since [place], has been doing well he is here for his annual checkup,    Objective   Vital Signs  Vitals:    05/19/25 1028   BP: 136/84   BP Location: Right arm   Patient Position: Sitting   Pulse: 81   Temp: 98 °F (36.7 °C)   SpO2: 95%   Weight: 114 kg (251 lb)   Height: 185.4 cm (72.99\")      Body mass index is 33.12 kg/m².  Review of Systems   Constitutional: Negative.    HENT: Negative.     Eyes: Negative.    Respiratory: Negative.     Cardiovascular: Negative.    Gastrointestinal: Negative.    Endocrine: Negative.    Genitourinary: Negative.    Musculoskeletal: Negative.    Allergic/Immunologic: Negative.    Neurological: Negative.    Hematological: Negative.    Psychiatric/Behavioral: Negative.        Physical Exam  Constitutional:       General: He is not in acute distress.     Appearance: Normal appearance.   HENT:      Head: Normocephalic.      Mouth/Throat:      Mouth: Mucous membranes are moist.   Eyes:      Conjunctiva/sclera: Conjunctivae normal.      Pupils: Pupils are equal, round, and reactive to light.   Cardiovascular:      Rate and Rhythm: Normal rate and regular rhythm.      Pulses: Normal pulses.      Heart sounds: Normal heart sounds.   Pulmonary:      Effort: Pulmonary effort is normal.      Breath sounds: Normal breath sounds.   Abdominal:      General: Bowel sounds are normal.      Palpations: Abdomen is soft.   Musculoskeletal:         General: No swelling. Normal range of motion.      Cervical back: Neck supple.   Skin:     General: Skin is warm and dry.      Coloration: Skin is not jaundiced.   Neurological:      General: No focal deficit present.      Mental Status: He is alert and oriented to person, place, and time. Mental status is at baseline.   Psychiatric:         Mood and Affect: Mood normal.         " "Behavior: Behavior normal.         Thought Content: Thought content normal.         Judgment: Judgment normal.        Result Review :   No results found for: \"PROBNP\", \"BNP\"  CMP          10/29/2024    07:22 5/14/2025    08:21   CMP   Glucose 102  84    BUN 16  13    Creatinine 1.03  0.87    EGFR 84.2  99.4    Sodium 137  139    Potassium 4.0  4.1    Chloride 102  101    Calcium 9.3  9.4    Total Protein 7.3  7.3    Albumin 4.5  4.3    Globulin 2.8  3.0    Total Bilirubin 0.5  0.4    Alkaline Phosphatase 57  54    AST (SGOT) 31  19    ALT (SGPT) 45  19    Albumin/Globulin Ratio 1.6  1.4    BUN/Creatinine Ratio 15.5  14.9    Anion Gap 10.0  13.0      CBC w/diff          10/29/2024    07:22 5/14/2025    08:21   CBC w/Diff   WBC 5.71  5.29    RBC 5.30  5.18    Hemoglobin 15.5  15.4    Hematocrit 47.6  46.5    MCV 89.8  89.8    MCH 29.2  29.7    MCHC 32.6  33.1    RDW 13.1  13.6    Platelets 213  207    Neutrophil Rel % 54.9  54.7    Immature Granulocyte Rel % 0.4  0.4    Lymphocyte Rel % 31.7  29.9    Monocyte Rel % 10.7  11.2    Eosinophil Rel % 1.9  3.0    Basophil Rel % 0.4  0.8       Lipid Panel          10/29/2024    07:22 5/14/2025    08:21   Lipid Panel   Total Cholesterol 177  119    Triglycerides 143  78    HDL Cholesterol 71  57    VLDL Cholesterol 24  16    LDL Cholesterol  82  46    LDL/HDL Ratio 1.09  0.81       Lab Results   Component Value Date    TSH 1.930 05/14/2025    TSH 4.800 (H) 10/29/2024    TSH 4.700 (H) 04/24/2024      Lab Results   Component Value Date    FREET4 1.65 05/14/2025    FREET4 1.41 10/29/2024    FREET4 1.36 04/24/2024         PSA          10/29/2024    07:22   PSA   PSA 0.482                     Visit Diagnoses:    ICD-10-CM ICD-9-CM   1. Physical exam, annual  Z00.00 V70.0   2. Hypogonadism in male  E29.1 257.2   3. Mixed hyperlipidemia  E78.2 272.2   4. Hypothyroidism due to Hashimoto's thyroiditis  E06.3 245.2   5. Hx of colonic polyp  Z86.0100 V12.72   6. Tobacco use disorder, " moderate, in early remission, dependence  F17.201 305.1   7. Screening PSA (prostate specific antigen)  Z12.5 V76.44   8. Class 2 severe obesity due to excess calories with serious comorbidity and body mass index (BMI) of 35.0 to 35.9 in adult  E66.812 278.01    E66.01 V85.35    Z68.35        Assessment and Plan   Diagnoses and all orders for this visit:    1. Physical exam, annual (Primary)    2. Hypogonadism in male  -      CT Chest Low Dose Cancer Screening WO; Future  -     Semaglutide-Weight Management 1.7 MG/0.75ML solution auto-injector; Inject 0.75 mL under the skin into the appropriate area as directed 1 (One) Time Per Week.  Dispense: 3 mL; Refill: 5  -     Stress Test With Myocardial Perfusion One Day; Future  -     CBC & Differential; Future  -     Comprehensive Metabolic Panel; Future  -     TSH+Free T4; Future  -     Lipid Panel; Future  -     PSA Screen; Future    3. Mixed hyperlipidemia  -      CT Chest Low Dose Cancer Screening WO; Future  -     Semaglutide-Weight Management 1.7 MG/0.75ML solution auto-injector; Inject 0.75 mL under the skin into the appropriate area as directed 1 (One) Time Per Week.  Dispense: 3 mL; Refill: 5  -     Stress Test With Myocardial Perfusion One Day; Future  -     CBC & Differential; Future  -     Comprehensive Metabolic Panel; Future  -     TSH+Free T4; Future  -     Lipid Panel; Future  -     PSA Screen; Future    4. Hypothyroidism due to Hashimoto's thyroiditis  -      CT Chest Low Dose Cancer Screening WO; Future  -     Semaglutide-Weight Management 1.7 MG/0.75ML solution auto-injector; Inject 0.75 mL under the skin into the appropriate area as directed 1 (One) Time Per Week.  Dispense: 3 mL; Refill: 5  -     Stress Test With Myocardial Perfusion One Day; Future  -     CBC & Differential; Future  -     Comprehensive Metabolic Panel; Future  -     TSH+Free T4; Future  -     Lipid Panel; Future  -     PSA Screen; Future    5. Hx of colonic polyp  -      CT Chest Low  Dose Cancer Screening WO; Future  -     Semaglutide-Weight Management 1.7 MG/0.75ML solution auto-injector; Inject 0.75 mL under the skin into the appropriate area as directed 1 (One) Time Per Week.  Dispense: 3 mL; Refill: 5  -     Stress Test With Myocardial Perfusion One Day; Future  -     CBC & Differential; Future  -     Comprehensive Metabolic Panel; Future  -     TSH+Free T4; Future  -     Lipid Panel; Future  -     PSA Screen; Future    6. Tobacco use disorder, moderate, in early remission, dependence  -      CT Chest Low Dose Cancer Screening WO; Future  -     Semaglutide-Weight Management 1.7 MG/0.75ML solution auto-injector; Inject 0.75 mL under the skin into the appropriate area as directed 1 (One) Time Per Week.  Dispense: 3 mL; Refill: 5  -     Stress Test With Myocardial Perfusion One Day; Future  -     CBC & Differential; Future  -     Comprehensive Metabolic Panel; Future  -     TSH+Free T4; Future  -     Lipid Panel; Future  -     PSA Screen; Future    7. Screening PSA (prostate specific antigen)  -      CT Chest Low Dose Cancer Screening WO; Future  -     Semaglutide-Weight Management 1.7 MG/0.75ML solution auto-injector; Inject 0.75 mL under the skin into the appropriate area as directed 1 (One) Time Per Week.  Dispense: 3 mL; Refill: 5  -     Stress Test With Myocardial Perfusion One Day; Future  -     CBC & Differential; Future  -     Comprehensive Metabolic Panel; Future  -     TSH+Free T4; Future  -     Lipid Panel; Future  -     PSA Screen; Future    8. Class 2 severe obesity due to excess calories with serious comorbidity and body mass index (BMI) of 35.0 to 35.9 in adult  -      CT Chest Low Dose Cancer Screening WO; Future  -     Semaglutide-Weight Management 1.7 MG/0.75ML solution auto-injector; Inject 0.75 mL under the skin into the appropriate area as directed 1 (One) Time Per Week.  Dispense: 3 mL; Refill: 5  -     Stress Test With Myocardial Perfusion One Day; Future  -     CBC &  Differential; Future  -     Comprehensive Metabolic Panel; Future  -     TSH+Free T4; Future  -     Lipid Panel; Future  -     PSA Screen; Future      Class 2 Severe Obesity (BMI >=35 and <=39.9). Obesity-related health conditions include the following: dyslipidemias. Obesity is worsening. BMI is is above average; BMI management plan is completed. We discussed portion control and increasing exercise.  Zepbound 2.5 mg sent in February 18, 2025, patient will monitor weight, calorie intake continue trying to walk every day if possible patient is done a great job with weight loss but organ to switch his zepbound to Wegovy due to insurance issues May 19, 2025 at the 1.7 mg dose as a conversion,     Annual exam May 19, 2025 benign measures discussed, patient wears a seatbelt does not smoke anymore he is active, encouraged him to continue exercise, weight loss efforts which he is done a good job with so far,    Mixed hyperlipidemia continues Crestor 10 mg daily, discussion about stopping medications continue weight loss efforts etc. May 19, 2025=== due to some body aches muscle aches, okay to decrease dose to every other day dosing    Palpitations---    Tobacco abuse, quit 2021 --- CT scan for low-dose screening, done at [facility], April 2024, results show emphysema, no acute findings benign exam, there are a few small nodules less than 4 mm considered benign by criteria, discussed with patient about doing CT yearly for several years just to follow,    Hypothyoridism,== continues levothyroxine 88 mcg daily    tinnitis     Neuropathy fingers// carpal tunnel?,    Colonoscopy feb 2023, micheline --normal , next one 5 yrs     Hypogonadism previous values from October 2021 reviewed----treatment options with AndroGel or Depo testosterone discussed briefly, October 25, 2023,    PSA 0.48 October 29th, 2024                   Follow Up   Return in about 6 months (around 11/19/2025).  Patient was given instructions and  counseling regarding his condition or for health maintenance advice. Please see specific information pulled into the AVS if appropriate.

## 2025-06-02 ENCOUNTER — HOSPITAL ENCOUNTER (OUTPATIENT)
Dept: CT IMAGING | Facility: HOSPITAL | Age: 60
Discharge: HOME | End: 2025-06-02
Admitting: INTERNAL MEDICINE
Payer: COMMERCIAL

## 2025-06-02 DIAGNOSIS — F17.201 TOBACCO USE DISORDER, MODERATE, IN EARLY REMISSION, DEPENDENCE: ICD-10-CM

## 2025-06-02 DIAGNOSIS — E06.3 HYPOTHYROIDISM DUE TO HASHIMOTO'S THYROIDITIS: ICD-10-CM

## 2025-06-02 DIAGNOSIS — Z12.5 SCREENING PSA (PROSTATE SPECIFIC ANTIGEN): ICD-10-CM

## 2025-06-02 DIAGNOSIS — E66.01 CLASS 2 SEVERE OBESITY DUE TO EXCESS CALORIES WITH SERIOUS COMORBIDITY AND BODY MASS INDEX (BMI) OF 35.0 TO 35.9 IN ADULT: ICD-10-CM

## 2025-06-02 DIAGNOSIS — Z86.0100 HX OF COLONIC POLYP: ICD-10-CM

## 2025-06-02 DIAGNOSIS — E78.2 MIXED HYPERLIPIDEMIA: ICD-10-CM

## 2025-06-02 DIAGNOSIS — E29.1 HYPOGONADISM IN MALE: ICD-10-CM

## 2025-06-02 DIAGNOSIS — E66.812 CLASS 2 SEVERE OBESITY DUE TO EXCESS CALORIES WITH SERIOUS COMORBIDITY AND BODY MASS INDEX (BMI) OF 35.0 TO 35.9 IN ADULT: ICD-10-CM

## 2025-06-02 PROCEDURE — 71271 CT THORAX LUNG CANCER SCR C-: CPT

## 2025-06-27 ENCOUNTER — TELEPHONE (OUTPATIENT)
Dept: INTERNAL MEDICINE | Age: 60
End: 2025-06-27
Payer: COMMERCIAL

## 2025-08-22 ENCOUNTER — OFFICE VISIT (OUTPATIENT)
Dept: INTERNAL MEDICINE | Age: 60
End: 2025-08-22
Payer: COMMERCIAL

## 2025-08-22 VITALS
OXYGEN SATURATION: 98 % | BODY MASS INDEX: 31.25 KG/M2 | WEIGHT: 235.8 LBS | DIASTOLIC BLOOD PRESSURE: 80 MMHG | HEART RATE: 78 BPM | SYSTOLIC BLOOD PRESSURE: 114 MMHG | HEIGHT: 73 IN | TEMPERATURE: 97.7 F

## 2025-08-22 DIAGNOSIS — M54.2 NECK PAIN: ICD-10-CM

## 2025-08-22 DIAGNOSIS — M25.511 ACUTE PAIN OF RIGHT SHOULDER: Primary | ICD-10-CM

## 2025-08-22 RX ORDER — ATORVASTATIN CALCIUM 20 MG/1
20 TABLET, FILM COATED ORAL EVERY OTHER DAY
COMMUNITY
End: 2025-08-22

## (undated) DEVICE — SOLIDIFIER LIQLOC PLS 1500CC BT

## (undated) DEVICE — Device: Brand: DEFENDO AIR/WATER/SUCTION AND BIOPSY VALVE

## (undated) DEVICE — THE SINGLE USE ETRAP – POLYP TRAP IS USED FOR SUCTION RETRIEVAL OF ENDOSCOPICALLY REMOVED POLYPS.: Brand: ETRAP

## (undated) DEVICE — SNAR E/S POLYP SNAREMASTER OVL/10MM 2.8X2300MM YEL

## (undated) DEVICE — LINER SURG CANSTR SXN S/RIGD 1500CC

## (undated) DEVICE — SOL IRRG H2O PL/BG 1000ML STRL

## (undated) DEVICE — Device

## (undated) DEVICE — CONN JET HYDRA H20 AUXILIARY DISP